# Patient Record
Sex: MALE | Race: WHITE | NOT HISPANIC OR LATINO | Employment: OTHER | URBAN - METROPOLITAN AREA
[De-identification: names, ages, dates, MRNs, and addresses within clinical notes are randomized per-mention and may not be internally consistent; named-entity substitution may affect disease eponyms.]

---

## 2017-09-04 ENCOUNTER — APPOINTMENT (OUTPATIENT)
Dept: GENERAL RADIOLOGY | Facility: HOSPITAL | Age: 47
End: 2017-09-04

## 2017-09-04 ENCOUNTER — HOSPITAL ENCOUNTER (INPATIENT)
Facility: HOSPITAL | Age: 47
LOS: 2 days | Discharge: HOME | End: 2017-09-07
Attending: EMERGENCY MEDICINE | Admitting: INTERNAL MEDICINE

## 2017-09-04 DIAGNOSIS — N30.01 ACUTE CYSTITIS WITH HEMATURIA: Primary | ICD-10-CM

## 2017-09-04 DIAGNOSIS — A41.9 SEPSIS, DUE TO UNSPECIFIED ORGANISM: ICD-10-CM

## 2017-09-04 LAB
ALBUMIN SERPL-MCNC: 4.3 G/DL (ref 3.2–4.8)
ALBUMIN/GLOB SERPL: 1.4 G/DL (ref 1.5–2.5)
ALP SERPL-CCNC: 63 U/L (ref 25–100)
ALT SERPL W P-5'-P-CCNC: 62 U/L (ref 7–40)
ANION GAP SERPL CALCULATED.3IONS-SCNC: 11 MMOL/L (ref 3–11)
APTT PPP: 29.1 SECONDS (ref 24–31)
ARTERIAL PATENCY WRIST A: ABNORMAL
AST SERPL-CCNC: 32 U/L (ref 0–33)
ATMOSPHERIC PRESS: ABNORMAL MMHG
BACTERIA UR QL AUTO: ABNORMAL /HPF
BASE EXCESS BLDA CALC-SCNC: 1.7 MMOL/L (ref 0–2)
BASOPHILS # BLD AUTO: 0.03 10*3/MM3 (ref 0–0.2)
BASOPHILS NFR BLD AUTO: 0.1 % (ref 0–1)
BDY SITE: ABNORMAL
BILIRUB SERPL-MCNC: 0.8 MG/DL (ref 0.3–1.2)
BILIRUB UR QL STRIP: NEGATIVE
BUN BLD-MCNC: 14 MG/DL (ref 9–23)
BUN/CREAT SERPL: 11.7 (ref 7–25)
CA-I SERPL ISE-MCNC: 1.14 MMOL/L (ref 1.12–1.32)
CALCIUM SPEC-SCNC: 8.9 MG/DL (ref 8.7–10.4)
CHLORIDE SERPL-SCNC: 103 MMOL/L (ref 99–109)
CLARITY UR: ABNORMAL
CO2 BLDA-SCNC: 25.9 MMOL/L (ref 22–33)
CO2 SERPL-SCNC: 25 MMOL/L (ref 20–31)
COHGB MFR BLD: 1.3 % (ref 0–2)
COLOR UR: YELLOW
CREAT BLD-MCNC: 1.2 MG/DL (ref 0.6–1.3)
CRP SERPL-MCNC: 5.16 MG/DL (ref 0–1)
D-LACTATE SERPL-SCNC: 1.9 MMOL/L (ref 0.5–2)
DEPRECATED RDW RBC AUTO: 41 FL (ref 37–54)
EOSINOPHIL # BLD AUTO: 0.02 10*3/MM3 (ref 0–0.3)
EOSINOPHIL NFR BLD AUTO: 0.1 % (ref 0–3)
ERYTHROCYTE [DISTWIDTH] IN BLOOD BY AUTOMATED COUNT: 12.8 % (ref 11.3–14.5)
GFR SERPL CREATININE-BSD FRML MDRD: 65 ML/MIN/1.73
GLOBULIN UR ELPH-MCNC: 3 GM/DL
GLUCOSE BLD-MCNC: 136 MG/DL (ref 70–100)
GLUCOSE UR STRIP-MCNC: NEGATIVE MG/DL
HCO3 BLDA-SCNC: 24.9 MMOL/L (ref 20–26)
HCT VFR BLD AUTO: 41.2 % (ref 38.9–50.9)
HCT VFR BLD CALC: 42.9 %
HGB BLD-MCNC: 13.8 G/DL (ref 13.1–17.5)
HGB BLDA-MCNC: 14 G/DL (ref 13.5–17.5)
HGB UR QL STRIP.AUTO: ABNORMAL
HOROWITZ INDEX BLD+IHG-RTO: 21 %
HYALINE CASTS UR QL AUTO: ABNORMAL /LPF
IMM GRANULOCYTES # BLD: 0.1 10*3/MM3 (ref 0–0.03)
IMM GRANULOCYTES NFR BLD: 0.5 % (ref 0–0.6)
INR PPP: 1.04
KETONES UR QL STRIP: NEGATIVE
LEUKOCYTE ESTERASE UR QL STRIP.AUTO: ABNORMAL
LYMPHOCYTES # BLD AUTO: 1.28 10*3/MM3 (ref 0.6–4.8)
LYMPHOCYTES NFR BLD AUTO: 6.3 % (ref 24–44)
MAGNESIUM SERPL-MCNC: 1.7 MG/DL (ref 1.3–2.7)
MCH RBC QN AUTO: 29.1 PG (ref 27–31)
MCHC RBC AUTO-ENTMCNC: 33.5 G/DL (ref 32–36)
MCV RBC AUTO: 86.9 FL (ref 80–99)
METHGB BLD QL: 0.8 % (ref 0–1.5)
MODALITY: ABNORMAL
MONOCYTES # BLD AUTO: 1.92 10*3/MM3 (ref 0–1)
MONOCYTES NFR BLD AUTO: 9.4 % (ref 0–12)
NEUTROPHILS # BLD AUTO: 17.06 10*3/MM3 (ref 1.5–8.3)
NEUTROPHILS NFR BLD AUTO: 83.6 % (ref 41–71)
NITRITE UR QL STRIP: NEGATIVE
OXYHGB MFR BLDV: 94.5 % (ref 94–99)
PCO2 BLDA: 33.9 MM HG (ref 35–48)
PH BLDA: 7.47 PH UNITS (ref 7.35–7.45)
PH UR STRIP.AUTO: 8.5 [PH] (ref 5–8)
PHOSPHATE SERPL-MCNC: 2.4 MG/DL (ref 2.4–5.1)
PLATELET # BLD AUTO: 229 10*3/MM3 (ref 150–450)
PMV BLD AUTO: 10 FL (ref 6–12)
PO2 BLDA: 71.9 MM HG (ref 83–108)
POTASSIUM BLD-SCNC: 3.9 MMOL/L (ref 3.5–5.5)
PROT SERPL-MCNC: 7.3 G/DL (ref 5.7–8.2)
PROT UR QL STRIP: ABNORMAL
PROTHROMBIN TIME: 11.3 SECONDS (ref 9.6–11.5)
RBC # BLD AUTO: 4.74 10*6/MM3 (ref 4.2–5.76)
RBC # UR: ABNORMAL /HPF
REF LAB TEST METHOD: ABNORMAL
SAO2 % BLDCOA: 94.5 %
SAO2 % BLDCOA: 94.5 %
SODIUM BLD-SCNC: 139 MMOL/L (ref 132–146)
SP GR UR STRIP: 1.02 (ref 1–1.03)
SQUAMOUS #/AREA URNS HPF: ABNORMAL /HPF
UROBILINOGEN UR QL STRIP: ABNORMAL
WBC NRBC COR # BLD: 20.41 10*3/MM3 (ref 3.5–10.8)
WBC UR QL AUTO: ABNORMAL /HPF

## 2017-09-04 PROCEDURE — 82805 BLOOD GASES W/O2 SATURATION: CPT | Performed by: EMERGENCY MEDICINE

## 2017-09-04 PROCEDURE — 93005 ELECTROCARDIOGRAM TRACING: CPT | Performed by: EMERGENCY MEDICINE

## 2017-09-04 PROCEDURE — 85025 COMPLETE CBC W/AUTO DIFF WBC: CPT | Performed by: EMERGENCY MEDICINE

## 2017-09-04 PROCEDURE — 87077 CULTURE AEROBIC IDENTIFY: CPT | Performed by: EMERGENCY MEDICINE

## 2017-09-04 PROCEDURE — 80053 COMPREHEN METABOLIC PANEL: CPT | Performed by: EMERGENCY MEDICINE

## 2017-09-04 PROCEDURE — 83735 ASSAY OF MAGNESIUM: CPT | Performed by: EMERGENCY MEDICINE

## 2017-09-04 PROCEDURE — 86140 C-REACTIVE PROTEIN: CPT | Performed by: EMERGENCY MEDICINE

## 2017-09-04 PROCEDURE — 36600 WITHDRAWAL OF ARTERIAL BLOOD: CPT | Performed by: EMERGENCY MEDICINE

## 2017-09-04 PROCEDURE — 87186 SC STD MICRODIL/AGAR DIL: CPT | Performed by: EMERGENCY MEDICINE

## 2017-09-04 PROCEDURE — 71010 HC CHEST PA OR AP: CPT

## 2017-09-04 PROCEDURE — 85610 PROTHROMBIN TIME: CPT | Performed by: EMERGENCY MEDICINE

## 2017-09-04 PROCEDURE — 99285 EMERGENCY DEPT VISIT HI MDM: CPT

## 2017-09-04 PROCEDURE — 87040 BLOOD CULTURE FOR BACTERIA: CPT | Performed by: EMERGENCY MEDICINE

## 2017-09-04 PROCEDURE — 81001 URINALYSIS AUTO W/SCOPE: CPT | Performed by: EMERGENCY MEDICINE

## 2017-09-04 PROCEDURE — 83605 ASSAY OF LACTIC ACID: CPT | Performed by: EMERGENCY MEDICINE

## 2017-09-04 PROCEDURE — 82330 ASSAY OF CALCIUM: CPT | Performed by: EMERGENCY MEDICINE

## 2017-09-04 PROCEDURE — 25010000002 MORPHINE PER 10 MG: Performed by: EMERGENCY MEDICINE

## 2017-09-04 PROCEDURE — 87086 URINE CULTURE/COLONY COUNT: CPT | Performed by: EMERGENCY MEDICINE

## 2017-09-04 PROCEDURE — 85730 THROMBOPLASTIN TIME PARTIAL: CPT | Performed by: EMERGENCY MEDICINE

## 2017-09-04 PROCEDURE — 84100 ASSAY OF PHOSPHORUS: CPT | Performed by: EMERGENCY MEDICINE

## 2017-09-04 PROCEDURE — 25010000002 PIPERACILLIN-TAZOBACTAM: Performed by: EMERGENCY MEDICINE

## 2017-09-04 RX ORDER — DICLOFENAC SODIUM 75 MG/1
75 TABLET, DELAYED RELEASE ORAL 2 TIMES DAILY
COMMUNITY

## 2017-09-04 RX ORDER — SODIUM CHLORIDE 0.9 % (FLUSH) 0.9 %
10 SYRINGE (ML) INJECTION AS NEEDED
Status: DISCONTINUED | OUTPATIENT
Start: 2017-09-04 | End: 2017-09-07 | Stop reason: HOSPADM

## 2017-09-04 RX ORDER — LISINOPRIL 20 MG/1
20 TABLET ORAL DAILY
COMMUNITY

## 2017-09-04 RX ORDER — SODIUM CHLORIDE 0.9 % (FLUSH) 0.9 %
10 SYRINGE (ML) INJECTION AS NEEDED
Status: CANCELLED | OUTPATIENT
Start: 2017-09-04

## 2017-09-04 RX ORDER — MORPHINE SULFATE 4 MG/ML
4 INJECTION, SOLUTION INTRAMUSCULAR; INTRAVENOUS ONCE
Status: COMPLETED | OUTPATIENT
Start: 2017-09-04 | End: 2017-09-04

## 2017-09-04 RX ORDER — OMEPRAZOLE 40 MG/1
40 CAPSULE, DELAYED RELEASE ORAL DAILY
COMMUNITY

## 2017-09-04 RX ADMIN — SODIUM CHLORIDE 2000 ML: 9 INJECTION, SOLUTION INTRAVENOUS at 23:48

## 2017-09-04 RX ADMIN — TAZOBACTAM SODIUM AND PIPERACILLIN SODIUM 4.5 G: .5; 4 INJECTION, POWDER, LYOPHILIZED, FOR SOLUTION INTRAVENOUS at 23:59

## 2017-09-04 RX ADMIN — MORPHINE SULFATE 4 MG: 4 INJECTION, SOLUTION INTRAMUSCULAR; INTRAVENOUS at 23:54

## 2017-09-05 ENCOUNTER — APPOINTMENT (OUTPATIENT)
Dept: CT IMAGING | Facility: HOSPITAL | Age: 47
End: 2017-09-05

## 2017-09-05 PROBLEM — A41.9 SEPSIS (HCC): Status: ACTIVE | Noted: 2017-09-05

## 2017-09-05 PROBLEM — I10 HYPERTENSION: Status: ACTIVE | Noted: 2017-09-05

## 2017-09-05 PROBLEM — K21.9 GERD (GASTROESOPHAGEAL REFLUX DISEASE): Status: ACTIVE | Noted: 2017-09-05

## 2017-09-05 PROBLEM — N39.0 COMPLICATED UTI (URINARY TRACT INFECTION): Status: ACTIVE | Noted: 2017-09-05

## 2017-09-05 PROBLEM — R73.9 HYPERGLYCEMIA: Status: ACTIVE | Noted: 2017-09-05

## 2017-09-05 PROBLEM — G93.41 METABOLIC ENCEPHALOPATHY: Status: ACTIVE | Noted: 2017-09-05

## 2017-09-05 LAB
ANION GAP SERPL CALCULATED.3IONS-SCNC: 10 MMOL/L (ref 3–11)
BASOPHILS # BLD AUTO: 0.03 10*3/MM3 (ref 0–0.2)
BASOPHILS NFR BLD AUTO: 0.1 % (ref 0–1)
BUN BLD-MCNC: 13 MG/DL (ref 9–23)
BUN/CREAT SERPL: 10 (ref 7–25)
C DIFF TOX GENS STL QL NAA+PROBE: NOT DETECTED
CALCIUM SPEC-SCNC: 8.5 MG/DL (ref 8.7–10.4)
CHLORIDE SERPL-SCNC: 104 MMOL/L (ref 99–109)
CO2 SERPL-SCNC: 25 MMOL/L (ref 20–31)
CREAT BLD-MCNC: 1.3 MG/DL (ref 0.6–1.3)
DEPRECATED RDW RBC AUTO: 42.9 FL (ref 37–54)
EOSINOPHIL # BLD AUTO: 0.01 10*3/MM3 (ref 0–0.3)
EOSINOPHIL NFR BLD AUTO: 0 % (ref 0–3)
ERYTHROCYTE [DISTWIDTH] IN BLOOD BY AUTOMATED COUNT: 13.2 % (ref 11.3–14.5)
GFR SERPL CREATININE-BSD FRML MDRD: 60 ML/MIN/1.73
GLUCOSE BLD-MCNC: 140 MG/DL (ref 70–100)
HBA1C MFR BLD: 5.4 % (ref 4.8–5.6)
HCT VFR BLD AUTO: 40.9 % (ref 38.9–50.9)
HGB BLD-MCNC: 13.2 G/DL (ref 13.1–17.5)
HOLD SPECIMEN: NORMAL
IMM GRANULOCYTES # BLD: 0.09 10*3/MM3 (ref 0–0.03)
IMM GRANULOCYTES NFR BLD: 0.4 % (ref 0–0.6)
LYMPHOCYTES # BLD AUTO: 1.45 10*3/MM3 (ref 0.6–4.8)
LYMPHOCYTES NFR BLD AUTO: 6.9 % (ref 24–44)
MCH RBC QN AUTO: 28.8 PG (ref 27–31)
MCHC RBC AUTO-ENTMCNC: 32.3 G/DL (ref 32–36)
MCV RBC AUTO: 89.3 FL (ref 80–99)
MONOCYTES # BLD AUTO: 1.68 10*3/MM3 (ref 0–1)
MONOCYTES NFR BLD AUTO: 8 % (ref 0–12)
NEUTROPHILS # BLD AUTO: 17.67 10*3/MM3 (ref 1.5–8.3)
NEUTROPHILS NFR BLD AUTO: 84.6 % (ref 41–71)
PLATELET # BLD AUTO: 212 10*3/MM3 (ref 150–450)
PMV BLD AUTO: 10.5 FL (ref 6–12)
POTASSIUM BLD-SCNC: 4.3 MMOL/L (ref 3.5–5.5)
RBC # BLD AUTO: 4.58 10*6/MM3 (ref 4.2–5.76)
SODIUM BLD-SCNC: 139 MMOL/L (ref 132–146)
WBC NRBC COR # BLD: 20.93 10*3/MM3 (ref 3.5–10.8)
WHOLE BLOOD HOLD SPECIMEN: NORMAL
WHOLE BLOOD HOLD SPECIMEN: NORMAL

## 2017-09-05 PROCEDURE — 25010000002 PIPERACILLIN-TAZOBACTAM: Performed by: PHYSICIAN ASSISTANT

## 2017-09-05 PROCEDURE — 25010000002 VANCOMYCIN: Performed by: EMERGENCY MEDICINE

## 2017-09-05 PROCEDURE — 87493 C DIFF AMPLIFIED PROBE: CPT | Performed by: PHYSICIAN ASSISTANT

## 2017-09-05 PROCEDURE — 85025 COMPLETE CBC W/AUTO DIFF WBC: CPT | Performed by: PHYSICIAN ASSISTANT

## 2017-09-05 PROCEDURE — 80048 BASIC METABOLIC PNL TOTAL CA: CPT | Performed by: PHYSICIAN ASSISTANT

## 2017-09-05 PROCEDURE — 25010000002 HEPARIN (PORCINE) PER 1000 UNITS: Performed by: PHYSICIAN ASSISTANT

## 2017-09-05 PROCEDURE — 0 IOPAMIDOL 61 % SOLUTION: Performed by: INTERNAL MEDICINE

## 2017-09-05 PROCEDURE — 83036 HEMOGLOBIN GLYCOSYLATED A1C: CPT | Performed by: PHYSICIAN ASSISTANT

## 2017-09-05 PROCEDURE — 99223 1ST HOSP IP/OBS HIGH 75: CPT | Performed by: INTERNAL MEDICINE

## 2017-09-05 PROCEDURE — 74177 CT ABD & PELVIS W/CONTRAST: CPT

## 2017-09-05 RX ORDER — ONDANSETRON 4 MG/1
4 TABLET, FILM COATED ORAL EVERY 6 HOURS PRN
Status: DISCONTINUED | OUTPATIENT
Start: 2017-09-05 | End: 2017-09-07 | Stop reason: HOSPADM

## 2017-09-05 RX ORDER — SODIUM CHLORIDE 0.9 % (FLUSH) 0.9 %
1-10 SYRINGE (ML) INJECTION AS NEEDED
Status: DISCONTINUED | OUTPATIENT
Start: 2017-09-05 | End: 2017-09-07 | Stop reason: HOSPADM

## 2017-09-05 RX ORDER — ONDANSETRON 2 MG/ML
4 INJECTION INTRAMUSCULAR; INTRAVENOUS EVERY 6 HOURS PRN
Status: DISCONTINUED | OUTPATIENT
Start: 2017-09-05 | End: 2017-09-07 | Stop reason: HOSPADM

## 2017-09-05 RX ORDER — PANTOPRAZOLE SODIUM 40 MG/1
40 TABLET, DELAYED RELEASE ORAL EVERY MORNING
Status: DISCONTINUED | OUTPATIENT
Start: 2017-09-05 | End: 2017-09-07 | Stop reason: HOSPADM

## 2017-09-05 RX ORDER — ACETAMINOPHEN 325 MG/1
650 TABLET ORAL EVERY 4 HOURS PRN
Status: DISCONTINUED | OUTPATIENT
Start: 2017-09-05 | End: 2017-09-07 | Stop reason: HOSPADM

## 2017-09-05 RX ORDER — SODIUM CHLORIDE 9 MG/ML
125 INJECTION, SOLUTION INTRAVENOUS CONTINUOUS
Status: DISCONTINUED | OUTPATIENT
Start: 2017-09-05 | End: 2017-09-07 | Stop reason: HOSPADM

## 2017-09-05 RX ORDER — DICLOFENAC SODIUM AND MISOPROSTOL 75; 200 MG/1; UG/1
1 TABLET, DELAYED RELEASE ORAL EVERY 12 HOURS PRN
Status: DISCONTINUED | OUTPATIENT
Start: 2017-09-05 | End: 2017-09-07 | Stop reason: HOSPADM

## 2017-09-05 RX ORDER — HEPARIN SODIUM 5000 [USP'U]/ML
5000 INJECTION, SOLUTION INTRAVENOUS; SUBCUTANEOUS EVERY 12 HOURS SCHEDULED
Status: DISCONTINUED | OUTPATIENT
Start: 2017-09-05 | End: 2017-09-07 | Stop reason: HOSPADM

## 2017-09-05 RX ADMIN — PANTOPRAZOLE SODIUM 40 MG: 40 TABLET, DELAYED RELEASE ORAL at 05:52

## 2017-09-05 RX ADMIN — SODIUM CHLORIDE 125 ML/HR: 9 INJECTION, SOLUTION INTRAVENOUS at 02:58

## 2017-09-05 RX ADMIN — VANCOMYCIN HYDROCHLORIDE 2000 MG: 1 INJECTION, POWDER, LYOPHILIZED, FOR SOLUTION INTRAVENOUS at 00:37

## 2017-09-05 RX ADMIN — PIPERACILLIN SODIUM,TAZOBACTAM SODIUM 3.38 G: 3; .375 INJECTION, POWDER, FOR SOLUTION INTRAVENOUS at 13:20

## 2017-09-05 RX ADMIN — ACETAMINOPHEN 650 MG: 325 TABLET, FILM COATED ORAL at 07:19

## 2017-09-05 RX ADMIN — HEPARIN SODIUM 5000 UNITS: 5000 INJECTION, SOLUTION INTRAVENOUS; SUBCUTANEOUS at 08:47

## 2017-09-05 RX ADMIN — PIPERACILLIN SODIUM,TAZOBACTAM SODIUM 3.38 G: 3; .375 INJECTION, POWDER, FOR SOLUTION INTRAVENOUS at 23:39

## 2017-09-05 RX ADMIN — PIPERACILLIN SODIUM,TAZOBACTAM SODIUM 3.38 G: 3; .375 INJECTION, POWDER, FOR SOLUTION INTRAVENOUS at 17:30

## 2017-09-05 RX ADMIN — PIPERACILLIN SODIUM,TAZOBACTAM SODIUM 3.38 G: 3; .375 INJECTION, POWDER, FOR SOLUTION INTRAVENOUS at 05:52

## 2017-09-05 RX ADMIN — SODIUM CHLORIDE 125 ML/HR: 9 INJECTION, SOLUTION INTRAVENOUS at 23:38

## 2017-09-05 RX ADMIN — IOPAMIDOL 100 ML: 612 INJECTION, SOLUTION INTRAVENOUS at 01:10

## 2017-09-05 RX ADMIN — ACETAMINOPHEN 650 MG: 325 TABLET, FILM COATED ORAL at 20:38

## 2017-09-05 RX ADMIN — HEPARIN SODIUM 5000 UNITS: 5000 INJECTION, SOLUTION INTRAVENOUS; SUBCUTANEOUS at 01:53

## 2017-09-05 RX ADMIN — HEPARIN SODIUM 5000 UNITS: 5000 INJECTION, SOLUTION INTRAVENOUS; SUBCUTANEOUS at 20:33

## 2017-09-05 RX ADMIN — DICLOFENAC SODIUM AND MISOPROSTOL 1 TABLET: 75; .2 TABLET, DELAYED RELEASE ORAL at 16:49

## 2017-09-05 NOTE — PAYOR COMM NOTE
"#[id number]        Joey García (46 y.o. Male)     Date of Birth Social Security Number Address Home Phone MRN    1970  [address] [facility] [phone] [id number]    Restoration Marital Status          None        Admission Date Admission Type Admitting Provider Attending Provider Department, Room/Bed    9/4/17 Emergency Natalia Spivey DO Barbato, Hayley R, DO [facility] 5G, S559/1    Discharge Date Discharge Disposition Discharge Destination                      Attending Provider: Natalia Spivey DO     Allergies:  Verapamil    Isolation:  None   Infection:  None   Code Status:  FULL    Ht:  72\" (182.9 cm)   Wt:  289 lb (131 kg)    Admission Cmt:  None   Principal Problem:  Sepsis [A41.9]                 Active Insurance as of 9/4/2017     Primary Coverage     Payor Plan Insurance Group Employer/Plan Group    [employer]ET[employer] COMMERCIAL AETNA [id number]     Payor Plan Address Payor Plan Phone Number Effective From Effective To    [address] [phone] 1/1/2016     [place] [id number]       Subscriber Name Subscriber Birth Date Member ID       CLARISA GARCÍA D 7/4/1975 [id number]                 Emergency Contacts      (Rel.) Home Phone Work Phone Mobile Phone    Clarisa García (Spouse) [phone] -- --               History & Physical      Isabel Butterfield MD at 9/5/2017 12:08 AM              [facility]  HISTORY AND PHYSICAL    Primary Care Physician: Tod Medellin MD    Subjective     Chief Complaint:  Dysuria    History of Present Illness:   This is a 46 year old male with a past medical history significant for WPW, hypertension, and GERD who presents with nearly 24 hours of dysuria. States he awoke at 2:00 am Monday morning to void when his stream \"suddenly stopped and he couldn't get the rest out\". He tried to returning to bed only to repeatedly get up and find that \"nothing was coming out\". There is " associated suprapubic pressure which is relieved with attempts to void. Also noting subjective fever with chills, diaphoresis, nausea without vomiting and non bloody diarrhea. Last bowel movement prior to arrival around 4 hours ago. States he has been on two rounds of Amoxicillin for a tooth abscess within the past month. Denies cough, congestion, SOB, chest pain, penile/testicular pain or discharge. No hematuria. No genital rash or history of prostate issues. No flank pain. Wife at bedside reporting that when she returned home from work this evening, patient appeared to be in acute distress. She was concerned with uncharacteristic pallor and shallow breathing. Patient now presenting to [facility] for further evaluation and treatment.     Emergency Department Evaluation: Septic. Febrile, T-max 100.5. Tachycardic to 127 with stable /70. Blood glucose elevated to 136.0. WBC increased to 20.4. Hematology and chemistry otherwise favorable. Urinalysis positive for acute infection. Negative nitrites. CXR negative for acute process.      Review of Systems   Constitutional: Positive for chills, diaphoresis, fatigue and fever.   HENT: Negative for congestion and trouble swallowing.    Eyes: Negative for photophobia and visual disturbance.   Respiratory: Negative for cough and shortness of breath.    Cardiovascular: Negative for chest pain and leg swelling.   Gastrointestinal: Positive for diarrhea and nausea. Negative for abdominal pain, constipation and vomiting.   Endocrine: Negative for cold intolerance and heat intolerance.   Genitourinary: Positive for difficulty urinating, dysuria and frequency. Negative for decreased urine volume, hematuria, penile pain and scrotal swelling.   Musculoskeletal: Negative for back pain and joint swelling.   Skin: Positive for pallor. Negative for rash.   Allergic/Immunologic: Negative for immunocompromised state.   Neurological: Negative for dizziness, syncope and weakness.  "  Hematological: Negative for adenopathy.   Psychiatric/Behavioral: Negative for agitation and confusion.      Otherwise complete ROS performed and negative except as mentioned in the HPI.    Past Medical History:   Diagnosis Date   • GERD (gastroesophageal reflux disease)    • Hypertension        Past Surgical History:   Procedure Laterality Date   • SHOULDER SURGERY         Fam hx: no known history of prostrate issues    Social History     Social History   • Marital status:      Spouse name: N/A   • Number of children: N/A   • Years of education: N/A     Occupational History   • Not on file.     Social History Main Topics   • Smoking status: Never Smoker   • Smokeless tobacco: Not on file   • Alcohol use Not on file   • Drug use: Not on file   • Sexual activity: Not on file     Other Topics Concern   • Not on file     Social History Narrative       Medications:    (Not in a hospital admission)    Allergies:  Allergies   Allergen Reactions   • Verapamil          Objective     Physical Exam:  Vital Signs: /73  Pulse 110  Temp 100.5 °F (38.1 °C) (Oral)   Resp 26  Ht 72\" (182.9 cm)  Wt 287 lb (130 kg)  SpO2 95%  BMI 38.92 kg/m2  Physical Exam  Constitutional: in no acute distress but toxic appearing  Eyes: PERRLA, sclerae anicteric, no conjunctival injection  HENT: NCAT, mucous membranes moist  Neck: supple, no thyromegaly, no lymphadenopathy, trachea midline  Respiratory: Clear to auscultation bilaterally, nonlabored respirations, diminished breath sounds  Cardiovascular: RRR, no murmurs, rubs, or gallops, palpable pedal pulses bilaterally  Gastrointestinal: Positive bowel sounds, soft, nontender, nondistended, morbidly obese  Musculoskeletal: No bilateral ankle edema, no clubbing or cyanosis to bilateral lower extremities  Psychiatric: oriented x 3, appropriate affect, cooperative  Neurologic: Strength symmetric in all extremities, Cranial Nerves grossly intact to confrontation, speech " clear  Derm: no rashes    Results Reviewed:    Results from last 7 days  Lab Units 09/04/17  2250   WBC 10*3/mm3 20.41*   HEMOGLOBIN g/dL 13.8   PLATELETS 10*3/mm3 229       Results from last 7 days  Lab Units 09/04/17  2250   SODIUM mmol/L 139   POTASSIUM mmol/L 3.9   CO2 mmol/L 25.0   CREATININE mg/dL 1.20   GLUCOSE mg/dL 136*   CALCIUM mg/dL 8.9       I have personally reviewed and interpreted available lab data, radiology studies and ECG obtained at time of admission.     Assessment / Plan     Problem List:   Hospital Problem List     * (Principal)Sepsis    Complicated UTI (urinary tract infection)    Hyperglycemia    Hypertension    GERD (gastroesophageal reflux disease)          Plan:  1. Sepsis secondary to acute, complicated UTI:  - Vitals improved and stable after 2L bolus in ED. Monitor. Lactic acid 1.9  - complicated secondary to urine retention. Consider urology consult in am  - administered Vanc and Zosyn in ED. Will continue and await blood and urine cultuers  - no history of resistant angie  - saline 125 cc/hr  - considering CT abdomen  - am labs    2. Hyperglycemia:  - no history of DM, possible reactive to acute inflammatory process  - in the setting of morbid obesity will check A1c    3. Hypertension:  - BP labile. Hold ACE-I for now    Patient stable for admission to TELEMETRY. Labs and vitals routine per nursing.      DVT prophylaxis: subcutaneous heparin  Code Status: full  Admission Status: Patient will be admitted to (LEXOBS or LEXINPT)     Vick Martinez PA-C 09/05/17 12:40 AM      Brief Attending Admission Note     Mr. Quintero is a 46 yoM with hx of HTN, WPW, GERD, [facility] wife with one day of  concerns of increased urinary hesitancy, trouble voiding that started last night at 2am, patient however went to work today continued to have the same issues, but on getting back home he looked sick and was incoherent per his wife hence his presentation to the ED.This  has been associated with nausea with no vomiting, chills no fever, he denies having any prostrate issues      Attending Physical Exam:  Constitutional: no acute distress, awake, alert  Eyes: PERRLA, sclerae anicteric, no conjunctival injection  HENT: NCAT, mucous membranes moist  Neck: supple, no thyromegaly, no lymphadenopathy, trachea midline  Respiratory: Clear to auscultation bilaterally, nonlabored respirations   Cardiovascular: RRR, no murmurs, rubs, or gallops, palpable pedal pulses bilaterally  Gastrointestinal: Obese, positive bowel sounds, soft, nontender, nondistended  Musculoskeletal: No bilateral ankle edema, no clubbing or cyanosis to bilateral lower extremities  Psychiatric: oriented x 3, appropriate affect, cooperative  Neurologic: Strength symmetric in all extremities, Cranial Nerves grossly intact to confrontation, speech clear  Derm: no rashes     Brief Assessment/Plan :  46 yoM with hx of HTN p/w mild confusion, urinary urgency, admitted with metabolic encephalopathy that has since resolved, suspect infectious from UTI; sepsis per criteria with leukocytosis, tachycardia and fever, source likely UTI thus complicated. Getting CT abd/pelvis to r/o any obstructive etiology considering patient reports trouble voiding. He was started on abx, will continue for now, vanc and zosyn, de-escalate as cultures dictate, continue IVF, repeat labs in AM.     See above for further detailed assessment and plan developed with APC which I have reviewed and/or edited.     I believe this patient meets INPATIENT status due to the need for care which can only be reasonably provided in an hospital setting such as aggressive/expedited ancillary services and/or consultation services, the necessity for IV medications, close physician monitoring and/or the possible need for procedures.  In such, I feel patient’s risk for adverse outcomes and need for care warrant INPATIENT evaluation and predict the patient’s care encounter  to likely last beyond 2 midnights.    Isabel Butterfield MD  09/05/17  1:09 AM          Electronically signed by Isabel Butterfield MD at 9/5/2017  1:12 AM           Emergency Department Notes      Parish Combs DO at 9/4/2017 10:14 PM          Subjective   HPI Comments: Mr. Joey Quintero is a 46 y.o. male who presents to the ED with c/o difficulty urinating. He notes at around 0300 yesterday morning he had trouble voiding and emptying his bladder. He does note of some urinary frequency but only is able to void less than 100 mL of urine at a time. He denies any pain associated with his difficulty urinating. He does note of some nausea, chills, loss of appetite, generalized weakness, and diarrhea at 1800 but denies any vomiting, fevers, or any other acute sx at this time. He has a hx of vocal cord dysfunction and WPW.    Wife notes that after he came home from work today, he looked very pale, was laboring to breathe, and just 'looked out of it.' They went to a [facility] and was told to come to the ED for evaluation.    Patient is a 46 y.o. male presenting with difficulty urinating.   History provided by:  Patient  Difficulty Urinating   Presenting symptoms: no dysuria and no penile pain    Relieved by:  None tried  Worsened by:  Nothing  Ineffective treatments:  None tried  Associated symptoms: diarrhea, nausea and urinary frequency    Associated symptoms: no abdominal pain, no fever, no flank pain and no vomiting        Review of Systems   Constitutional: Positive for appetite change (Decreased), chills and fatigue. Negative for fever.   Respiratory: Positive for shortness of breath (Per wife).    Gastrointestinal: Positive for diarrhea and nausea. Negative for abdominal pain and vomiting.   Genitourinary: Positive for decreased urine volume, difficulty urinating and frequency. Negative for dysuria, flank pain and penile pain.   Skin: Positive for pallor (Per wife).   Neurological: Negative for light-headedness.   All other systems  reviewed and are negative.      Past Medical History:   Diagnosis Date   • GERD (gastroesophageal reflux disease)    • Hypertension        Allergies   Allergen Reactions   • Verapamil        Past Surgical History:   Procedure Laterality Date   • SHOULDER SURGERY         History reviewed. No pertinent family history.    Social History     Social History   • Marital status:      Spouse name: N/A   • Number of children: N/A   • Years of education: N/A     Social History Main Topics   • Smoking status: Never Smoker   • Smokeless tobacco: None   • Alcohol use None   • Drug use: None   • Sexual activity: Not Asked     Other Topics Concern   • None     Social History Narrative         Objective   Physical Exam   Constitutional: He is oriented to person, place, and time. He appears well-developed and well-nourished. No distress.   HENT:   Head: Normocephalic and atraumatic.   Nose: Nose normal.   Eyes: Conjunctivae are normal. No scleral icterus.   Neck: Normal range of motion. Neck supple.   Cardiovascular: Regular rhythm, normal heart sounds and intact distal pulses.  Tachycardia present.    No murmur heard.  Pulmonary/Chest: Effort normal and breath sounds normal. Tachypnea noted. No respiratory distress.   Abdominal: Soft. There is tenderness (Mild suprapubic discomfort to deep palpation).   Musculoskeletal: Normal range of motion.   Neurological: He is alert and oriented to person, place, and time.   No deficits   Skin: Skin is warm and dry. He is not diaphoretic.   Psychiatric: He has a normal mood and affect. His behavior is normal.   Nursing note and vitals reviewed.      Procedures        ED Course  ED Course   Comment By Time   Dr. Combs discussed case with Dr. Butterfield, hospitalist, who will admit the patient. Mauram Luisito 09/04 6997     Recent Results (from the past 24 hour(s))   Urinalysis With / Culture If Indicated    Collection Time: 09/04/17  9:33 PM   Result Value Ref Range    Color, UA Yellow Yellow,  Straw    Appearance, UA Cloudy (A) Clear    pH, UA 8.5 (H) 5.0 - 8.0    Specific Gravity, UA 1.021 1.001 - 1.030    Glucose, UA Negative Negative    Ketones, UA Negative Negative    Bilirubin, UA Negative Negative    Blood, UA Large (3+) (A) Negative    Protein, UA Trace (A) Negative    Leuk Esterase, UA Moderate (2+) (A) Negative    Nitrite, UA Negative Negative    Urobilinogen, UA 1.0 E.U./dL 0.2 - 1.0 E.U./dL   Urinalysis, Microscopic Only    Collection Time: 09/04/17  9:33 PM   Result Value Ref Range    RBC, UA Too Numerous to Count (A) None Seen, 0-2 /HPF    WBC, UA Too Numerous to Count (A) None Seen /HPF    Bacteria, UA Trace None Seen, Trace /HPF    Squamous Epithelial Cells, UA None Seen None Seen, 0-2 /HPF    Hyaline Casts, UA 0-6 0 - 6 /LPF    Methodology Automated Microscopy    Urine Culture    Collection Time: 09/04/17  9:33 PM   Result Value Ref Range    Urine Culture Culture in progress    Comprehensive Metabolic Panel    Collection Time: 09/04/17 10:50 PM   Result Value Ref Range    Glucose 136 (H) 70 - 100 mg/dL    BUN 14 9 - 23 mg/dL    Creatinine 1.20 0.60 - 1.30 mg/dL    Sodium 139 132 - 146 mmol/L    Potassium 3.9 3.5 - 5.5 mmol/L    Chloride 103 99 - 109 mmol/L    CO2 25.0 20.0 - 31.0 mmol/L    Calcium 8.9 8.7 - 10.4 mg/dL    Total Protein 7.3 5.7 - 8.2 g/dL    Albumin 4.30 3.20 - 4.80 g/dL    ALT (SGPT) 62 (H) 7 - 40 U/L    AST (SGOT) 32 0 - 33 U/L    Alkaline Phosphatase 63 25 - 100 U/L    Total Bilirubin 0.8 0.3 - 1.2 mg/dL    eGFR Non African Amer 65 >60 mL/min/1.73    Globulin 3.0 gm/dL    A/G Ratio 1.4 (L) 1.5 - 2.5 g/dL    BUN/Creatinine Ratio 11.7 7.0 - 25.0    Anion Gap 11.0 3.0 - 11.0 mmol/L   Protime-INR    Collection Time: 09/04/17 10:50 PM   Result Value Ref Range    Protime 11.3 9.6 - 11.5 Seconds    INR 1.04    aPTT    Collection Time: 09/04/17 10:50 PM   Result Value Ref Range    PTT 29.1 24.0 - 31.0 seconds   Magnesium    Collection Time: 09/04/17 10:50 PM   Result Value Ref  Range    Magnesium 1.7 1.3 - 2.7 mg/dL   Phosphorus    Collection Time: 09/04/17 10:50 PM   Result Value Ref Range    Phosphorus 2.4 2.4 - 5.1 mg/dL   Calcium, Ionized    Collection Time: 09/04/17 10:50 PM   Result Value Ref Range    Ionized Calcium 1.14 1.12 - 1.32 mmol/L   Lactic Acid, Plasma    Collection Time: 09/04/17 10:50 PM   Result Value Ref Range    Lactate 1.9 0.5 - 2.0 mmol/L   C-reactive Protein    Collection Time: 09/04/17 10:50 PM   Result Value Ref Range    C-Reactive Protein 5.16 (H) 0.00 - 1.00 mg/dL   Light Blue Top    Collection Time: 09/04/17 10:50 PM   Result Value Ref Range    Extra Tube hold for add-on    Green Top (Gel)    Collection Time: 09/04/17 10:50 PM   Result Value Ref Range    Extra Tube Hold for add-ons.    Lavender Top    Collection Time: 09/04/17 10:50 PM   Result Value Ref Range    Extra Tube hold for add-on    CBC Auto Differential    Collection Time: 09/04/17 10:50 PM   Result Value Ref Range    WBC 20.41 (H) 3.50 - 10.80 10*3/mm3    RBC 4.74 4.20 - 5.76 10*6/mm3    Hemoglobin 13.8 13.1 - 17.5 g/dL    Hematocrit 41.2 38.9 - 50.9 %    MCV 86.9 80.0 - 99.0 fL    MCH 29.1 27.0 - 31.0 pg    MCHC 33.5 32.0 - 36.0 g/dL    RDW 12.8 11.3 - 14.5 %    RDW-SD 41.0 37.0 - 54.0 fl    MPV 10.0 6.0 - 12.0 fL    Platelets 229 150 - 450 10*3/mm3    Neutrophil % 83.6 (H) 41.0 - 71.0 %    Lymphocyte % 6.3 (L) 24.0 - 44.0 %    Monocyte % 9.4 0.0 - 12.0 %    Eosinophil % 0.1 0.0 - 3.0 %    Basophil % 0.1 0.0 - 1.0 %    Immature Grans % 0.5 0.0 - 0.6 %    Neutrophils, Absolute 17.06 (H) 1.50 - 8.30 10*3/mm3    Lymphocytes, Absolute 1.28 0.60 - 4.80 10*3/mm3    Monocytes, Absolute 1.92 (H) 0.00 - 1.00 10*3/mm3    Eosinophils, Absolute 0.02 0.00 - 0.30 10*3/mm3    Basophils, Absolute 0.03 0.00 - 0.20 10*3/mm3    Immature Grans, Absolute 0.10 (H) 0.00 - 0.03 10*3/mm3   Blood Gas, Arterial    Collection Time: 09/04/17 11:31 PM   Result Value Ref Range    Site Arterial: left radial     Tee's Test N/A      pH, Arterial 7.474 (H) 7.350 - 7.450 pH units    pCO2, Arterial 33.9 (L) 35.0 - 48.0 mm Hg    pO2, Arterial 71.9 (L) 83.0 - 108.0 mm Hg    HCO3, Arterial 24.9 20.0 - 26.0 mmol/L    Base Excess, Arterial 1.7 0.0 - 2.0 mmol/L    O2 Saturation, Arterial 94.5 %    O2 Saturation Calculated 94.5 %    Hemoglobin, Blood Gas 14.0 13.5 - 17.5 g/dL    Hematocrit, Blood Gas 42.9 %    Oxyhemoglobin 94.5 94 - 99 %    Methemoglobin 0.80 0.00 - 1.50 %    Carboxyhemoglobin 1.3 0 - 2 %    CO2 Content 25.9 22 - 33    Barometric Pressure for Blood Gas  mmHg    Modality Room Air     FIO2 21 %   Basic Metabolic Panel    Collection Time: 09/05/17  3:19 AM   Result Value Ref Range    Glucose 140 (H) 70 - 100 mg/dL    BUN 13 9 - 23 mg/dL    Creatinine 1.30 0.60 - 1.30 mg/dL    Sodium 139 132 - 146 mmol/L    Potassium 4.3 3.5 - 5.5 mmol/L    Chloride 104 99 - 109 mmol/L    CO2 25.0 20.0 - 31.0 mmol/L    Calcium 8.5 (L) 8.7 - 10.4 mg/dL    eGFR Non African Amer 60 (L) >60 mL/min/1.73    BUN/Creatinine Ratio 10.0 7.0 - 25.0    Anion Gap 10.0 3.0 - 11.0 mmol/L   CBC Auto Differential    Collection Time: 09/05/17  3:19 AM   Result Value Ref Range    WBC 20.93 (H) 3.50 - 10.80 10*3/mm3    RBC 4.58 4.20 - 5.76 10*6/mm3    Hemoglobin 13.2 13.1 - 17.5 g/dL    Hematocrit 40.9 38.9 - 50.9 %    MCV 89.3 80.0 - 99.0 fL    MCH 28.8 27.0 - 31.0 pg    MCHC 32.3 32.0 - 36.0 g/dL    RDW 13.2 11.3 - 14.5 %    RDW-SD 42.9 37.0 - 54.0 fl    MPV 10.5 6.0 - 12.0 fL    Platelets 212 150 - 450 10*3/mm3    Neutrophil % 84.6 (H) 41.0 - 71.0 %    Lymphocyte % 6.9 (L) 24.0 - 44.0 %    Monocyte % 8.0 0.0 - 12.0 %    Eosinophil % 0.0 0.0 - 3.0 %    Basophil % 0.1 0.0 - 1.0 %    Immature Grans % 0.4 0.0 - 0.6 %    Neutrophils, Absolute 17.67 (H) 1.50 - 8.30 10*3/mm3    Lymphocytes, Absolute 1.45 0.60 - 4.80 10*3/mm3    Monocytes, Absolute 1.68 (H) 0.00 - 1.00 10*3/mm3    Eosinophils, Absolute 0.01 0.00 - 0.30 10*3/mm3    Basophils, Absolute 0.03 0.00 - 0.20 10*3/mm3     "Immature Grans, Absolute 0.09 (H) 0.00 - 0.03 10*3/mm3   Hemoglobin A1c    Collection Time: 09/05/17  3:19 AM   Result Value Ref Range    Hemoglobin A1C 5.40 4.80 - 5.60 %     Note: In addition to lab results from this visit, the labs listed above may include labs taken at another facility or during a different encounter within the last 24 hours. Please correlate lab times with ED admission and discharge times for further clarification of the services performed during this visit.    CT Abdomen Pelvis With Contrast   Final Result   Abnormal      1. No acute findings.      2. Non-acute findings are described above.      THIS DOCUMENT HAS BEEN ELECTRONICALLY SIGNED BY SHALINI MAC MD      XR Chest 1 View   Final Result   Abnormal      Normal chest radiograph.      THIS DOCUMENT HAS BEEN ELECTRONICALLY SIGNED BY SHALINI MAC MD        Vitals:    09/05/17 0030 09/05/17 0100 09/05/17 0559 09/05/17 0710   BP: 125/73 112/73 131/59 128/73   BP Location:  Right arm Right arm Right arm   Patient Position:  Lying Lying Lying   Pulse: 110 102  114   Resp:  22 20 20   Temp:  99.4 °F (37.4 °C) 99.1 °F (37.3 °C)    TempSrc:  Oral Oral    SpO2: 95%      Weight:  289 lb (131 kg)     Height:  72\" (182.9 cm)       Medications   sodium chloride 0.9 % flush 10 mL (not administered)   sodium chloride 0.9 % flush 1-10 mL (not administered)   heparin (porcine) 5000 UNIT/ML injection 5,000 Units (5,000 Units Subcutaneous Given 9/5/17 2326)   sodium chloride 0.9 % infusion (125 mL/hr Intravenous New Bag 9/5/17 0258)   acetaminophen (TYLENOL) tablet 650 mg (650 mg Oral Given 9/5/17 0719)   ondansetron (ZOFRAN) tablet 4 mg (not administered)     Or   ondansetron (ZOFRAN) injection 4 mg (not administered)   melatonin sublingual tablet 5 mg (not administered)   Pharmacy to dose vancomycin (not administered)   piperacillin-tazobactam (ZOSYN) 3.375 g/100 mL 0.9% NS IVPB (mbp) (3.375 g Intravenous New Bag 9/5/17 9812)   pantoprazole (PROTONIX) EC " tablet 40 mg (40 mg Oral Given 9/5/17 0552)   vancomycin 1500 mg/500 mL 0.9% NS IVPB ([facility]) (not administered)   ! Vancomycin trough 9/6 at 1130 (not administered)   sodium chloride 0.9 % bolus 2,000 mL (2,000 mL Intravenous New Bag 9/4/17 2348)   piperacillin-tazobactam (ZOSYN) 4.5 g/100 mL 0.9% NS IVPB (mbp) (0 g Intravenous Stopped 9/5/17 0032)   vancomycin 2000 mg/500 mL 0.9% NS IVPB ([facility]) (2,000 mg Intravenous New Bag 9/5/17 0037)   Morphine sulfate (PF) injection 4 mg (4 mg Intravenous Given 9/4/17 2354)   iopamidol (ISOVUE-300) 61 % injection 100 mL (100 mL Intravenous Given 9/5/17 0110)     ECG/EMG Results (last 24 hours)     ** No results found for the last 24 hours. **                        [facility]    Final diagnoses:   Acute cystitis with hematuria   Sepsis, due to unspecified organism       Documentation assistance provided by patsy KAUFMAN.  Information recorded by the scriblori was done at my direction and has been verified and validated by me.     Fernanda Kaufman  09/04/17 2229       Parish Combs DO  09/05/17 0933       Electronically signed by Parish Combs DO at 9/5/2017  9:33 AM        Vital Signs (last 24 hours)       09/04 0700  -  09/05 0659 09/05 0700  -  09/05 1457   Most Recent    Temp (°F) 99.1 -  100.5      98.5     98.5 (36.9)    Heart Rate 102 -  (!)127      114     114    Resp 20 -  26      20     20    /73 -  145/80      128/73     128/73    SpO2 (%) 95 -  98       95          Hospital Medications (all)       Dose Frequency Start End    acetaminophen (TYLENOL) tablet 650 mg 650 mg Every 4 Hours PRN 9/5/2017     Sig - Route: Take 2 tablets by mouth Every 4 (Four) Hours As Needed for Mild Pain . - Oral    diclofenac-misoprostol (ARTHROTEC 75) 75-0.2 MG EC tablet 1 tablet 1 tablet Every 12 Hours PRN 9/5/2017     Sig - Route: Take 1 tablet by mouth Every 12 (Twelve) Hours As Needed (joint pain). - Oral    heparin (porcine) 5000 UNIT/ML injection 5,000 Units 5,000 Units Every 12 Hours Scheduled  "9/5/2017     Sig - Route: Inject 1 mL under the skin Every 12 (Twelve) Hours. - Subcutaneous    iopamidol (ISOVUE-300) 61 % injection 100 mL 100 mL Once in Imaging 9/5/2017 9/5/2017    Sig - Route: Infuse 100 mL into a venous catheter Once. - Intravenous    melatonin sublingual tablet 5 mg 5 mg Nightly PRN 9/5/2017     Sig - Route: Place 1 tablet under the tongue At Night As Needed (sleep). - Sublingual    Morphine sulfate (PF) injection 4 mg 4 mg Once 9/4/2017 9/4/2017    Sig - Route: Infuse 1 mL into a venous catheter 1 (One) Time. - Intravenous    ondansetron (ZOFRAN) injection 4 mg 4 mg Every 6 Hours PRN 9/5/2017     Sig - Route: Infuse 2 mL into a venous catheter Every 6 (Six) Hours As Needed for Nausea or Vomiting. - Intravenous    Linked Group 1:  \"Or\" Linked Group Details        ondansetron (ZOFRAN) tablet 4 mg 4 mg Every 6 Hours PRN 9/5/2017     Sig - Route: Take 1 tablet by mouth Every 6 (Six) Hours As Needed for Nausea or Vomiting. - Oral    Linked Group 1:  \"Or\" Linked Group Details        pantoprazole (PROTONIX) EC tablet 40 mg 40 mg Every Morning 9/5/2017     Sig - Route: Take 1 tablet by mouth Every Morning. - Oral    piperacillin-tazobactam (ZOSYN) 3.375 g/100 mL 0.9% NS IVPB ([facility]) 3.375 g Every 6 Hours 9/5/2017     Sig - Route: Infuse 100 mL into a venous catheter Every 6 (Six) Hours. - Intravenous    piperacillin-tazobactam (ZOSYN) 4.5 g/100 mL 0.9% NS IVPB ([facility]p) 4.5 g Once 9/4/2017 9/5/2017    Sig - Route: Infuse 100 mL into a venous catheter 1 (One) Time. - Intravenous    sodium chloride 0.9 % bolus 2,000 mL 2,000 mL Once 9/4/2017 9/4/2017    Sig - Route: Infuse 2,000 mL into a venous catheter 1 (One) Time. - Intravenous    sodium chloride 0.9 % flush 1-10 mL 1-10 mL As Needed 9/5/2017     Sig - Route: Infuse 1-10 mL into a venous catheter As Needed for Line Care. - Intravenous    sodium chloride 0.9 % flush 10 mL 10 mL As Needed 9/4/2017     Sig - Route: Infuse 10 mL into a venous catheter As " Needed for Line Care. - Intravenous    sodium chloride 0.9 % infusion 125 mL/hr Continuous 9/5/2017     Sig - Route: Infuse 125 mL/hr into a venous catheter Continuous. - Intravenous    vancomycin 2000 mg/500 mL 0.9% NS IVPB ([facility]) 2,000 mg Once 9/4/2017 9/5/2017    Sig - Route: Infuse 500 mL into a venous catheter 1 (One) Time. - Intravenous    ! Vancomycin trough 9/6 at 1130 (Discontinued)  Once 9/6/2017 9/5/2017    Sig - Route: 1 (One) Time. - Does not apply    Pharmacy to dose vancomycin (Discontinued)  Continuous PRN 9/5/2017 9/5/2017    Sig - Route: Continuous As Needed for Consult. - Does not apply    vancomycin 1500 mg/500 mL 0.9% NS IVPB ([facility]) (Discontinued) 11 mg/kg × 130 kg Every 12 Hours 9/5/2017 9/5/2017    Sig - Route: Infuse 500 mL into a venous catheter Every 12 (Twelve) Hours. - Intravenous    vancomycin 2500 mg/500 mL 0.9% NS IVPB ([facility]) (Discontinued) 20 mg/kg × 130 kg Once 9/4/2017 9/4/2017    Sig - Route: Infuse 500 mL into a venous catheter 1 (One) Time. - Intravenous    Reason for Discontinue: Dose adjustment          Lab Results (last 72 hours)     Procedure Component Value Units Date/Time    Urinalysis With / Culture If Indicated [[id number]]  (Abnormal) Collected:  09/04/17 2133    Specimen:  Urine from Urine, Clean Catch Updated:  09/04/17 2154     Color, UA Yellow     Appearance, UA Cloudy (A)     pH, UA 8.5 (H)     Specific Gravity, UA 1.021     Glucose, UA Negative     Ketones, UA Negative     Bilirubin, UA Negative     Blood, UA Large (3+) (A)     Protein, UA Trace (A)     Leuk Esterase, UA Moderate (2+) (A)     Nitrite, UA Negative     Urobilinogen, UA 1.0 E.U./dL    Urinalysis, Microscopic Only [[id number]]  (Abnormal) Collected:  09/04/17 2133    Specimen:  Urine from Urine, Clean Catch Updated:  09/04/17 2154     RBC, UA Too Numerous to Count (A) /HPF      WBC, UA Too Numerous to Count (A) /HPF      Bacteria, UA Trace /HPF      Squamous Epithelial Cells, UA None Seen /HPF      Hyaline  Casts, UA 0-6 /LPF      Methodology Automated Microscopy    CBC & Differential [[id number]] Collected:  09/04/17 2250    Specimen:  Blood Updated:  09/04/17 2307    Narrative:       The following orders were created for panel order CBC & Differential.  Procedure                               Abnormality         Status                     ---------                               -----------         ------                     CBC Auto Differential[[id number]]        Abnormal            Final result                 Please view results for these tests on the individual orders.    CBC Auto Differential [[id number]]  (Abnormal) Collected:  09/04/17 2250    Specimen:  Blood Updated:  09/04/17 2307     WBC 20.41 (H) 10*3/mm3      RBC 4.74 10*6/mm3      Hemoglobin 13.8 g/dL      Hematocrit 41.2 %      MCV 86.9 fL      MCH 29.1 pg      MCHC 33.5 g/dL      RDW 12.8 %      RDW-SD 41.0 fl      MPV 10.0 fL      Platelets 229 10*3/mm3      Neutrophil % 83.6 (H) %      Lymphocyte % 6.3 (L) %      Monocyte % 9.4 %      Eosinophil % 0.1 %      Basophil % 0.1 %      Immature Grans % 0.5 %      Neutrophils, Absolute 17.06 (H) 10*3/mm3      Lymphocytes, Absolute 1.28 10*3/mm3      Monocytes, Absolute 1.92 (H) 10*3/mm3      Eosinophils, Absolute 0.02 10*3/mm3      Basophils, Absolute 0.03 10*3/mm3      Immature Grans, Absolute 0.10 (H) 10*3/mm3     Protime-INR [[id number]] Collected:  09/04/17 2250    Specimen:  Blood Updated:  09/04/17 2321     Protime 11.3 Seconds      INR 1.04    Narrative:       Therapeutic Ranges for INR: 2.0-3.0 (PT 20-30)                              2.5-3.5 (PT 25-34)    aPTT [[id number]]  (Normal) Collected:  09/04/17 2250    Specimen:  Blood Updated:  09/04/17 2321     PTT 29.1 seconds     Narrative:       PTT = The equivalent PTT values for the therapeutic range of heparin levels at 0.3 to 0.5 U/ml are 45 to 60 seconds.    Lactic Acid, Plasma [[id number]]  (Normal) Collected:  09/04/17 2250    Specimen:  Blood  Updated:  09/04/17 2329     Lactate 1.9 mmol/L       Falsely depressed results may occur on samples drawn from patients receiving N-Acetylcysteine (NAC) or Metamizole.       C-reactive Protein [[id number]]  (Abnormal) Collected:  09/04/17 2250    Specimen:  Blood Updated:  09/04/17 2335     C-Reactive Protein 5.16 (H) mg/dL     Comprehensive Metabolic Panel [[id number]]  (Abnormal) Collected:  09/04/17 2250    Specimen:  Blood Updated:  09/04/17 2338     Glucose 136 (H) mg/dL      BUN 14 mg/dL      Creatinine 1.20 mg/dL      Sodium 139 mmol/L      Potassium 3.9 mmol/L      Chloride 103 mmol/L      CO2 25.0 mmol/L      Calcium 8.9 mg/dL      Total Protein 7.3 g/dL      Albumin 4.30 g/dL      ALT (SGPT) 62 (H) U/L      AST (SGOT) 32 U/L      Alkaline Phosphatase 63 U/L      Total Bilirubin 0.8 mg/dL      eGFR Non African Amer 65 mL/min/1.73      Globulin 3.0 gm/dL      A/G Ratio 1.4 (L) g/dL      BUN/Creatinine Ratio 11.7     Anion Gap 11.0 mmol/L     Narrative:       National Kidney Foundation Guidelines    Stage     Description        GFR  1         Normal or High     90+  2         Mild decrease      60-89  3         Moderate decrease  30-59  4         Severe decrease    15-29  5         Kidney failure     <15    Magnesium [[id number]]  (Normal) Collected:  09/04/17 2250    Specimen:  Blood Updated:  09/04/17 2338     Magnesium 1.7 mg/dL     Phosphorus [[id number]]  (Normal) Collected:  09/04/17 2250    Specimen:  Blood Updated:  09/04/17 2338     Phosphorus 2.4 mg/dL     Blood Gas, Arterial [[id number]]  (Abnormal) Collected:  09/04/17 2331    Specimen:  Arterial Blood Updated:  09/04/17 2340     Site Arterial: left radial     Tee's Test N/A     pH, Arterial 7.474 (H) pH units      pCO2, Arterial 33.9 (L) mm Hg      pO2, Arterial 71.9 (L) mm Hg      HCO3, Arterial 24.9 mmol/L      Base Excess, Arterial 1.7 mmol/L      O2 Saturation, Arterial 94.5 %      O2 Saturation Calculated 94.5 %      Hemoglobin, Blood Gas 14.0  g/dL      Hematocrit, Blood Gas 42.9 %      Oxyhemoglobin 94.5 %      Methemoglobin 0.80 %      Carboxyhemoglobin 1.3 %      CO2 Content 25.9     Barometric Pressure for Blood Gas -- mmHg       N/A        Modality Room Air     FIO2 21 %     Calcium, Ionized [[id number]]  (Normal) Collected:  09/04/17 2250    Specimen:  Blood Updated:  09/04/17 2340     Ionized Calcium 1.14 mmol/L     Light Blue Top [[id number]] Collected:  09/04/17 2250    Specimen:  Blood Updated:  09/05/17 0002     Extra Tube hold for add-on      Auto resulted       Green Top (Gel) [[id number]] Collected:  09/04/17 2250    Specimen:  Blood Updated:  09/05/17 0002     Extra Tube Hold for add-ons.      Auto resulted.       Lavender Top [[id number]] Collected:  09/04/17 2250    Specimen:  Blood Updated:  09/05/17 0002     Extra Tube hold for add-on      Auto resulted       CBC Auto Differential [[id number]]  (Abnormal) Collected:  09/05/17 0319    Specimen:  Blood Updated:  09/05/17 0359     WBC 20.93 (H) 10*3/mm3      RBC 4.58 10*6/mm3      Hemoglobin 13.2 g/dL      Hematocrit 40.9 %      MCV 89.3 fL      MCH 28.8 pg      MCHC 32.3 g/dL      RDW 13.2 %      RDW-SD 42.9 fl      MPV 10.5 fL      Platelets 212 10*3/mm3      Neutrophil % 84.6 (H) %      Lymphocyte % 6.9 (L) %      Monocyte % 8.0 %      Eosinophil % 0.0 %      Basophil % 0.1 %      Immature Grans % 0.4 %      Neutrophils, Absolute 17.67 (H) 10*3/mm3      Lymphocytes, Absolute 1.45 10*3/mm3      Monocytes, Absolute 1.68 (H) 10*3/mm3      Eosinophils, Absolute 0.01 10*3/mm3      Basophils, Absolute 0.03 10*3/mm3      Immature Grans, Absolute 0.09 (H) 10*3/mm3     Basic Metabolic Panel [[id number]]  (Abnormal) Collected:  09/05/17 0319    Specimen:  Blood Updated:  09/05/17 0413     Glucose 140 (H) mg/dL      BUN 13 mg/dL      Creatinine 1.30 mg/dL      Sodium 139 mmol/L      Potassium 4.3 mmol/L      Chloride 104 mmol/L      CO2 25.0 mmol/L      Calcium 8.5 (L) mg/dL      eGFR Non African Amer  60 (L) mL/min/1.73      BUN/Creatinine Ratio 10.0     Anion Gap 10.0 mmol/L     Narrative:       National Kidney Foundation Guidelines    Stage     Description        GFR  1         Normal or High     90+  2         Mild decrease      60-89  3         Moderate decrease  30-59  4         Severe decrease    15-29  5         Kidney failure     <15    [facility] Draw [[id number]] Collected:  09/04/17 2250    Specimen:  Blood Updated:  09/05/17 0616    Narrative:       The following orders were created for panel order [facility] Draw.  Procedure                               Abnormality         Status                     ---------                               -----------         ------                     Light Blue Top[[id number]]                                   Final result               Green Top (Gel)[[id number]]                                  Final result               Lavender Top[[id number]]                                     Final result               Gold Top - SST[[id number]]                                                              Green Top (No Gel)[[id number]]                                                            Please view results for these tests on the individual orders.    Hemoglobin A1c [[id number]]  (Normal) Collected:  09/05/17 0319    Specimen:  Blood Updated:  09/05/17 0838     Hemoglobin A1C 5.40 %     Narrative:       The American Diabetes Association recommends maintenance of Hemoglobin A1C at 7.0% or lower. Goals for Hemoglobin A1C reduction may need to be modified if hypoglycemia is a problem.    Urine Culture [[id number]]  (Abnormal) Collected:  09/04/17 2133    Specimen:  Urine from Urine, Clean Catch Updated:  09/05/17 1008     Urine Culture >100,000 CFU/mL Gram Negative Bacilli (A)    Clostridium Difficile Toxin [[id number]] Collected:  09/05/17 1020    Specimen:  Stool from Per Rectum Updated:  09/05/17 1141    Narrative:       The following orders were created for panel order Clostridium  Difficile Toxin.  Procedure                               Abnormality         Status                     ---------                               -----------         ------                     Clostridium Difficile To...[[id number]]  Normal              Final result                 Please view results for these tests on the individual orders.    Clostridium Difficile Toxin, PCR [[id number]]  (Normal) Collected:  09/05/17 1020    Specimen:  Stool from Per Rectum Updated:  09/05/17 1141     C. Difficile Toxins by PCR Not Detected    Narrative:         Performance characteristics of test not established for patients <2 years of age.  Negative for Toxigenic C. Difficile    Blood Culture [[id number]]  (Normal) Collected:  09/04/17 2250    Specimen:  Blood from Arm, Left Updated:  09/05/17 1201     Blood Culture No growth at less than 24 hours    Blood Culture [[id number]]  (Normal) Collected:  09/04/17 2250    Specimen:  Blood from Hand, Left Updated:  09/05/17 1201     Blood Culture No growth at less than 24 hours          Imaging Results (last 24 hours)     Procedure Component Value Units Date/Time    XR Chest 1 View [[id number]]  (Abnormal) Collected:  09/04/17 2235     Updated:  09/04/17 2332    Narrative:       EXAM:  XR Chest, 1 View    CLINICAL HISTORY:  46 years old, male; Signs and symptoms; Other: Sepsis protocol C/O difficulty   urinating; Additional info: Severe sepsis protocol    TECHNIQUE:  Frontal view of the chest.    COMPARISON:  No relevant prior studies available.    FINDINGS:  Lungs:  Normal.  No consolidation.  Pleural space:  Normal.  No pneumothorax.  Heart:  Normal.  No cardiomegaly.  Mediastinum:  Normal.  Bones/joints:  Normal.      Impression:         Normal chest radiograph.    THIS DOCUMENT HAS BEEN ELECTRONICALLY SIGNED BY SHALINI MAC MD    CT Abdomen Pelvis With Contrast [[id number]]  (Abnormal) Collected:  09/05/17 0056     Updated:  09/05/17 0135    Narrative:       EXAM:  CT Abdomen and Pelvis  With Intravenous Contrast    CLINICAL HISTORY:  46 years old, male; Sepsis, unspecified organism; Acute cystitis with   hematuria; Signs and symptoms; Other: See notes; Additional info: Urinary   retention    TECHNIQUE:  Axial computed tomography images of the abdomen and pelvis with intravenous   contrast.  All CT scans at this facility use one or more dose reduction   techniques, viz.: automated exposure control; ma/kV adjustment per patient size   (including targeted exams where dose is matched to indication; i.e. head); or   iterative reconstruction technique.  Coronal reformatted images were created and reviewed.    CONTRAST:  100 mL of isovue 300 administered intravenously.    COMPARISON:  No relevant prior studies available.    FINDINGS:  Lower thorax: No acute findings.    ABDOMEN:  Liver:  Hepatic steatosis.  Gallbladder and bile ducts:  Normal.  Pancreas:  Normal.  Spleen:  Normal.  Adrenals:  Normal.  Kidneys and ureters:  Normal.  Stomach and bowel:  Normal.  Appendix:  Appendix normal.    PELVIS:  Bladder:  Normal.  Reproductive:  Prostate gland is mildly enlarged, measuring 4.5 cm in   anteroposterior dimension.    ABDOMEN and PELVIS:  Intraperitoneal space:  Normal.  No free air.  No significant fluid collection.  Bones/joints:  No acute fracture.  No dislocation.  Soft tissues:  Normal.  Vasculature:  Normal.  No abdominal aortic aneurysm.  Lymph nodes:  Several small lymph nodes within the retroperitoneum, likely   reactive.      Impression:         1. No acute findings.    2. Non-acute findings are described above.    THIS DOCUMENT HAS BEEN ELECTRONICALLY SIGNED BY SHALINI MAC MD           Physician Progress Notes (last 24 hours) (Notes from 9/4/2017  2:58 PM through 9/5/2017  2:58 PM)      Natalia Spivey DO at 9/5/2017 11:19 AM  Version 1 of 1             [facility]  INPATIENT PROGRESS NOTE    Date of Admission: 9/4/2017  Length of Stay: 0  Primary Care  Physician: Tod Medellin MD    Subjective   CC: f/u dysuria, sepsis  HPI:  Patient resting in bed. No complaints. Continues to have dysuria. Denies fever, endorses chills.    Review Of Systems:   Review of Systems  Negative for fever,chills, chest pain, headache, shortness of breath, nausea, vomiting, diarrhea, constipation    Objective      Temp:  [98.5 °F (36.9 °C)-100.5 °F (38.1 °C)] 98.5 °F (36.9 °C)  Heart Rate:  [102-127] 114  Resp:  [20-26] 20  BP: (112-145)/(55-89) 128/73  Physical Exam   GEN:Patient is WD/WN, alert and oriented x3, in NAD, he is resting comfortably in bed  HEENT: AT/NC  NECK: Neck is without mass or JVD  CV: RRR no m/r/g, S1,S2  LUNGS: CTAB no w/r/r  ABD: BSx4, NT/ND, no suprapubic tenderness  SKIN: no rashes, lesions  NEURO: no focal deficits  PSYCH: Mood is appropriate  EXT: no c/c/e/e    Results Review:    I have reviewed the labs, radiology results and diagnostic studies.      Results from last 7 days  Lab Units 09/05/17  0319   WBC 10*3/mm3 20.93*   HEMOGLOBIN g/dL 13.2   PLATELETS 10*3/mm3 212       Results from last 7 days  Lab Units 09/05/17  0319   SODIUM mmol/L 139   POTASSIUM mmol/L 4.3   CHLORIDE mmol/L 104   CO2 mmol/L 25.0   BUN mg/dL 13   CREATININE mg/dL 1.30   GLUCOSE mg/dL 140*   CALCIUM mg/dL 8.5*       Culture Data: Cultures:    Urine Culture   Date Value Ref Range Status   09/04/2017 >100,000 CFU/mL Gram Negative Bacilli (A)  Preliminary       Radiology Data:     I have reviewed the medications.    Assessment/Plan     Problem List  Hospital Problem List     * (Principal)Sepsis    GERD (gastroesophageal reflux disease)    Complicated UTI (urinary tract infection)    Hypertension    Hyperglycemia        Assessment/Plan:  1. Sepsis secondary to Urinary Tract Infection:  - UA showing pyuria with +leuk esterase  - urine cx grown GN Bacilli, will stop Vanc, continue Zosyn and adjust abx pending culture results  - lactate normalized  - CT abd/pelvis unremarkable - no obvious  outflow obstruction noted  - leukocytosis persists, afebrile, hemodynamically stable    2. HTN:  - holding home BP meds, normotensive    3. GERD  - PPI    DVT prophylaxis: heparin  Discharge Planning: I expect patient to be discharged TBD    Natalia Spivey DO   09/05/17   11:19 AM         Electronically signed by Natalia Spivey DO at 9/5/2017 11:28 AM        Consult Notes (last 24 hours) (Notes from 9/4/2017  2:58 PM through 9/5/2017  2:58 PM)     No notes of this type exist for this encounter.

## 2017-09-05 NOTE — PROGRESS NOTES
Discharge Planning Assessment  [facility]     Patient Name: Joey Quintero  MRN: [id number]  Today's Date: 9/5/2017    Admit Date: 9/4/2017          Discharge Needs Assessment       09/05/17 9934    Living Environment    Lives With child(lori), dependent;spouse    Living Arrangements house    Home Accessibility bed and bath on same level;stairs within home;no concerns    Type of Financial/Environmental Concern none    Transportation Available car    Living Environment Comment CM spoke with pt in room with permission in regards to discharge planning Pt resides in [facility] [place] with spouse, Clarisa, and 2 children, ages 14 and 16. Pt is independent of ADLs prior to admission.  Pt lives in a 2 story house, but states master BR on 1st level and he doesn't have to go upstairs.     Living Environment    Provides Primary Care For no one    Quality Of Family Relationships supportive    Able to Return to Prior Living Arrangements yes    Living Arrangement Comments Plan is home with family when medically ready for discharge.     Discharge Needs Assessment    Concerns To Be Addressed denies needs/concerns at this time    Readmission Within The Last 30 Days no previous admission in last 30 days    Outpatient/Agency/Support Group Needs --   Pt states has used HH in past, but unsure of agency.     Anticipated Changes Related to Illness other (see comments)   Denies discharge needs.    Equipment Currently Used at Home none    Equipment Needed After Discharge none    Discharge Disposition still a patient    Discharge Contact Information if Applicable Clarisa Quintero(spouse): [phone]    Discharge Planning Comments Pt has [employer] Insurance and denies recent changes in insurance. Pt states his prescriptions require a copay, but copay is affordable. Pt uses [facility] pharmacy in [place] and denies difficulty obtaining meds. [facility] for [facility]. and spouse will help him. Pt denies  discharge needs. CM will cont to follow.            Discharge Plan       09/05/17 1521    Case Management/Social Work Plan    Plan discharge plan    Patient/Family In Agreement With Plan yes    Additional Comments Plan is home with family. Denies discharge needs. CM will cont to follow.        Discharge Placement     No information found        Expected Discharge Date and Time     Expected Discharge Date Expected Discharge Time    Sep 7, 2017               Demographic Summary       09/05/17 1513    Primary Care Physician Information    Name Tod Medellin(in [place])            Functional Status       09/05/17 1514    Functional Status Prior    Ambulation 0-->independent    Transferring 0-->independent    Toileting 0-->independent    Bathing 0-->independent    Dressing 0-->independent    Eating 0-->independent    Communication 0-->understands/communicates without difficulty    Swallowing 0-->swallows foods/liquids without difficulty            Psychosocial     None            Abuse/Neglect     None            Legal     None            Substance Abuse     None            Patient Forms     None          Laila Whitney, RICARDO

## 2017-09-05 NOTE — PROGRESS NOTES
Pharmacokinetic Consult - Vancomycin Dosing  Joey Quintero is a 46 y.o. male who has been consulted for vancomycin dosing for bacteremia (goal trough 15-20 mcg/mL).    Current Antimicrobial Therapy  Vancomycin 1500mg IV q12h  Zosyn 3.375 gm IV q6h    Allergies  Verapamil    Relevant clinical data and objective history reviewed:  Creatinine   Date Value Ref Range Status   09/04/2017 1.20 0.60 - 1.30 mg/dL Final     Estimated Creatinine Clearance: 108.4 mL/min (by C-G formula based on Cr of 1.2).     Patient weight: 287 lb (130 kg)    Asessment/Plan  Will initiate dose at 2000 mg IV once (given in ED 9/5 at 0037)       followed by  Vancomycin 1500mg IV q 12 hours    Vancomycin trough is scheduled 9/6 at 1130 prior to the 4th dose    Asher Prieto [facility]  9/5/2017  1:47 AM

## 2017-09-05 NOTE — PLAN OF CARE
Problem: Patient Care Overview (Adult)  Goal: Plan of Care Review  Outcome: Ongoing (interventions implemented as appropriate)    09/05/17 0212   Coping/Psychosocial Response Interventions   Plan Of Care Reviewed With patient;spouse   Patient Care Overview   Progress progress toward functional goals as expected       Goal: Discharge Needs Assessment  Outcome: Ongoing (interventions implemented as appropriate)    09/05/17 0212   Discharge Needs Assessment   Concerns To Be Addressed no discharge needs identified   Readmission Within The Last 30 Days no previous admission in last 30 days   Equipment Needed After Discharge none   Current Health   Anticipated Changes Related to Illness none   Self-Care   Equipment Currently Used at Home none   Living Environment   Transportation Available family or friend will provide

## 2017-09-05 NOTE — H&P
"    [facility] Medicine Services  HISTORY AND PHYSICAL    Primary Care Physician: Tod Medellin MD    Subjective     Chief Complaint:  Dysuria    History of Present Illness:   This is a 46 year old male with a past medical history significant for WPW, hypertension, and GERD who presents with nearly 24 hours of dysuria. States he awoke at 2:00 am Monday morning to void when his stream \"suddenly stopped and he couldn't get the rest out\". He tried to returning to bed only to repeatedly get up and find that \"nothing was coming out\". There is associated suprapubic pressure which is relieved with attempts to void. Also noting subjective fever with chills, diaphoresis, nausea without vomiting and non bloody diarrhea. Last bowel movement prior to arrival around 4 hours ago. States he has been on two rounds of Amoxicillin for a tooth abscess within the past month. Denies cough, congestion, SOB, chest pain, penile/testicular pain or discharge. No hematuria. No genital rash or history of prostate issues. No flank pain. Wife at bedside reporting that when she returned home from work this evening, patient appeared to be in acute distress. She was concerned with uncharacteristic pallor and shallow breathing. Patient now presenting to [facility] for further evaluation and treatment.     Emergency Department Evaluation: Septic. Febrile, T-max 100.5. Tachycardic to 127 with stable /70. Blood glucose elevated to 136.0. WBC increased to 20.4. Hematology and chemistry otherwise favorable. Urinalysis positive for acute infection. Negative nitrites. CXR negative for acute process.      Review of Systems   Constitutional: Positive for chills, diaphoresis, fatigue and fever.   HENT: Negative for congestion and trouble swallowing.    Eyes: Negative for photophobia and visual disturbance.   Respiratory: Negative for cough and shortness of breath.    Cardiovascular: Negative for chest pain and leg swelling. " "  Gastrointestinal: Positive for diarrhea and nausea. Negative for abdominal pain, constipation and vomiting.   Endocrine: Negative for cold intolerance and heat intolerance.   Genitourinary: Positive for difficulty urinating, dysuria and frequency. Negative for decreased urine volume, hematuria, penile pain and scrotal swelling.   Musculoskeletal: Negative for back pain and joint swelling.   Skin: Positive for pallor. Negative for rash.   Allergic/Immunologic: Negative for immunocompromised state.   Neurological: Negative for dizziness, syncope and weakness.   Hematological: Negative for adenopathy.   Psychiatric/Behavioral: Negative for agitation and confusion.      Otherwise complete ROS performed and negative except as mentioned in the HPI.    Past Medical History:   Diagnosis Date   • GERD (gastroesophageal reflux disease)    • Hypertension        Past Surgical History:   Procedure Laterality Date   • SHOULDER SURGERY         Fam hx: no known history of prostrate issues    Social History     Social History   • Marital status:      Spouse name: N/A   • Number of children: N/A   • Years of education: N/A     Occupational History   • Not on file.     Social History Main Topics   • Smoking status: Never Smoker   • Smokeless tobacco: Not on file   • Alcohol use Not on file   • Drug use: Not on file   • Sexual activity: Not on file     Other Topics Concern   • Not on file     Social History Narrative       Medications:    (Not in a hospital admission)    Allergies:  Allergies   Allergen Reactions   • Verapamil          Objective     Physical Exam:  Vital Signs: /73  Pulse 110  Temp 100.5 °F (38.1 °C) (Oral)   Resp 26  Ht 72\" (182.9 cm)  Wt 287 lb (130 kg)  SpO2 95%  BMI 38.92 kg/m2  Physical Exam  Constitutional: in no acute distress but toxic appearing  Eyes: PERRLA, sclerae anicteric, no conjunctival injection  HENT: NCAT, mucous membranes moist  Neck: supple, no thyromegaly, no lymphadenopathy, " trachea midline  Respiratory: Clear to auscultation bilaterally, nonlabored respirations, diminished breath sounds  Cardiovascular: RRR, no murmurs, rubs, or gallops, palpable pedal pulses bilaterally  Gastrointestinal: Positive bowel sounds, soft, nontender, nondistended, morbidly obese  Musculoskeletal: No bilateral ankle edema, no clubbing or cyanosis to bilateral lower extremities  Psychiatric: oriented x 3, appropriate affect, cooperative  Neurologic: Strength symmetric in all extremities, Cranial Nerves grossly intact to confrontation, speech clear  Derm: no rashes    Results Reviewed:    Results from last 7 days  Lab Units 09/04/17  2250   WBC 10*3/mm3 20.41*   HEMOGLOBIN g/dL 13.8   PLATELETS 10*3/mm3 229       Results from last 7 days  Lab Units 09/04/17  2250   SODIUM mmol/L 139   POTASSIUM mmol/L 3.9   CO2 mmol/L 25.0   CREATININE mg/dL 1.20   GLUCOSE mg/dL 136*   CALCIUM mg/dL 8.9       I have personally reviewed and interpreted available lab data, radiology studies and ECG obtained at time of admission.     Assessment / Plan     Problem List:   Hospital Problem List     * (Principal)Sepsis    Complicated UTI (urinary tract infection)    Hyperglycemia    Hypertension    GERD (gastroesophageal reflux disease)          Plan:  1. Sepsis secondary to acute, complicated UTI:  - Vitals improved and stable after 2L bolus in ED. Monitor. Lactic acid 1.9  - complicated secondary to urine retention. Consider urology consult in am  - administered Vanc and Zosyn in ED. Will continue and await blood and urine cultuers  - no history of resistant angie  - saline 125 cc/hr  - considering CT abdomen  - am labs    2. Hyperglycemia:  - no history of DM, possible reactive to acute inflammatory process  - in the setting of morbid obesity will check A1c    3. Hypertension:  - BP labile. Hold ACE-I for now    Patient stable for admission to TELEMETRY. Labs and vitals routine per nursing.      DVT prophylaxis: subcutaneous  heparin  Code Status: full  Admission Status: Patient will be admitted to (LEXOBS or LEXINPT)     Vick Martinez PA-C 09/05/17 12:40 AM      Brief Attending Admission Note     Mr. Quintero is a 46 yoM with hx of HTN, WPW, GERD, he presents to [facility] accompanied by his wife with one day of  concerns of increased urinary hesitancy, trouble voiding that started last night at 2am, patient however went to work today continued to have the same issues, but on getting back home he looked sick and was incoherent per his wife hence his presentation to the ED.This has been associated with nausea with no vomiting, chills no fever, he denies having any prostrate issues      Attending Physical Exam:  Constitutional: no acute distress, awake, alert  Eyes: PERRLA, sclerae anicteric, no conjunctival injection  HENT: NCAT, mucous membranes moist  Neck: supple, no thyromegaly, no lymphadenopathy, trachea midline  Respiratory: Clear to auscultation bilaterally, nonlabored respirations   Cardiovascular: RRR, no murmurs, rubs, or gallops, palpable pedal pulses bilaterally  Gastrointestinal: Obese, positive bowel sounds, soft, nontender, nondistended  Musculoskeletal: No bilateral ankle edema, no clubbing or cyanosis to bilateral lower extremities  Psychiatric: oriented x 3, appropriate affect, cooperative  Neurologic: Strength symmetric in all extremities, Cranial Nerves grossly intact to confrontation, speech clear  Derm: no rashes     Brief Assessment/Plan :  46 yoM with hx of HTN p/w mild confusion, urinary urgency, admitted with metabolic encephalopathy that has since resolved, suspect infectious from UTI; sepsis per criteria with leukocytosis, tachycardia and fever, source likely UTI thus complicated. Getting CT abd/pelvis to r/o any obstructive etiology considering patient reports trouble voiding. He was started on abx, will continue for now, vanc and zosyn, de-escalate as cultures dictate, continue IVF, repeat labs in AM.     See  above for further detailed assessment and plan developed with [facility] which I have reviewed and/or edited.     I believe this patient meets INPATIENT status due to the need for care which can only be reasonably provided in an hospital setting such as aggressive/expedited ancillary services and/or consultation services, the necessity for IV medications, close physician monitoring and/or the possible need for procedures.  In such, I feel patient’s risk for adverse outcomes and need for care warrant INPATIENT evaluation and predict the patient’s care encounter to likely last beyond 2 midnights.    Isabel Butterfield MD  09/05/17  1:09 AM

## 2017-09-05 NOTE — ED PROVIDER NOTES
Subjective   HPI Comments: Mr. Joey Quintero is a 46 y.o. male who presents to the ED with c/o difficulty urinating. He notes at around 0300 yesterday morning he had trouble voiding and emptying his bladder. He does note of some urinary frequency but only is able to void less than 100 mL of urine at a time. He denies any pain associated with his difficulty urinating. He does note of some nausea, chills, loss of appetite, generalized weakness, and diarrhea at 1800 but denies any vomiting, fevers, or any other acute sx at this time. He has a hx of vocal cord dysfunction and WPW.    Wife notes that after he came home from work today, he looked very pale, was laboring to breathe, and just 'looked out of it.' They went to a [facility] and was told to come to the ED for evaluation.    Patient is a 46 y.o. male presenting with difficulty urinating.   History provided by:  Patient  Difficulty Urinating   Presenting symptoms: no dysuria and no penile pain    Relieved by:  None tried  Worsened by:  Nothing  Ineffective treatments:  None tried  Associated symptoms: diarrhea, nausea and urinary frequency    Associated symptoms: no abdominal pain, no fever, no flank pain and no vomiting        Review of Systems   Constitutional: Positive for appetite change (Decreased), chills and fatigue. Negative for fever.   Respiratory: Positive for shortness of breath (Per wife).    Gastrointestinal: Positive for diarrhea and nausea. Negative for abdominal pain and vomiting.   Genitourinary: Positive for decreased urine volume, difficulty urinating and frequency. Negative for dysuria, flank pain and penile pain.   Skin: Positive for pallor (Per wife).   Neurological: Negative for light-headedness.   All other systems reviewed and are negative.      Past Medical History:   Diagnosis Date   • GERD (gastroesophageal reflux disease)    • Hypertension        Allergies   Allergen Reactions   • Verapamil        Past Surgical History:   Procedure Laterality  Date   • SHOULDER SURGERY         History reviewed. No pertinent family history.    Social History     Social History   • Marital status:      Spouse name: N/A   • Number of children: N/A   • Years of education: N/A     Social History Main Topics   • Smoking status: Never Smoker   • Smokeless tobacco: None   • Alcohol use None   • Drug use: None   • Sexual activity: Not Asked     Other Topics Concern   • None     Social History Narrative         Objective   Physical Exam   Constitutional: He is oriented to person, place, and time. He appears well-developed and well-nourished. No distress.   HENT:   Head: Normocephalic and atraumatic.   Nose: Nose normal.   Eyes: Conjunctivae are normal. No scleral icterus.   Neck: Normal range of motion. Neck supple.   Cardiovascular: Regular rhythm, normal heart sounds and intact distal pulses.  Tachycardia present.    No murmur heard.  Pulmonary/Chest: Effort normal and breath sounds normal. Tachypnea noted. No respiratory distress.   Abdominal: Soft. There is tenderness (Mild suprapubic discomfort to deep palpation).   Musculoskeletal: Normal range of motion.   Neurological: He is alert and oriented to person, place, and time.   No deficits   Skin: Skin is warm and dry. He is not diaphoretic.   Psychiatric: He has a normal mood and affect. His behavior is normal.   Nursing note and vitals reviewed.      Procedures         ED Course  ED Course   Comment By Time   Dr. Combs discussed case with Dr. Butterfield, hospitalist, who will admit the patient. Mauram Jorge 09/04 3774     Recent Results (from the past 24 hour(s))   Urinalysis With / Culture If Indicated    Collection Time: 09/04/17  9:33 PM   Result Value Ref Range    Color, UA Yellow Yellow, Straw    Appearance, UA Cloudy (A) Clear    pH, UA 8.5 (H) 5.0 - 8.0    Specific Gravity, UA 1.021 1.001 - 1.030    Glucose, UA Negative Negative    Ketones, UA Negative Negative    Bilirubin, UA Negative Negative    Blood, UA Large (3+)  (A) Negative    Protein, UA Trace (A) Negative    Leuk Esterase, UA Moderate (2+) (A) Negative    Nitrite, UA Negative Negative    Urobilinogen, UA 1.0 E.U./dL 0.2 - 1.0 E.U./dL   Urinalysis, Microscopic Only    Collection Time: 09/04/17  9:33 PM   Result Value Ref Range    RBC, UA Too Numerous to Count (A) None Seen, 0-2 /HPF    WBC, UA Too Numerous to Count (A) None Seen /HPF    Bacteria, UA Trace None Seen, Trace /HPF    Squamous Epithelial Cells, UA None Seen None Seen, 0-2 /HPF    Hyaline Casts, UA 0-6 0 - 6 /LPF    Methodology Automated Microscopy    Urine Culture    Collection Time: 09/04/17  9:33 PM   Result Value Ref Range    Urine Culture Culture in progress    Comprehensive Metabolic Panel    Collection Time: 09/04/17 10:50 PM   Result Value Ref Range    Glucose 136 (H) 70 - 100 mg/dL    BUN 14 9 - 23 mg/dL    Creatinine 1.20 0.60 - 1.30 mg/dL    Sodium 139 132 - 146 mmol/L    Potassium 3.9 3.5 - 5.5 mmol/L    Chloride 103 99 - 109 mmol/L    CO2 25.0 20.0 - 31.0 mmol/L    Calcium 8.9 8.7 - 10.4 mg/dL    Total Protein 7.3 5.7 - 8.2 g/dL    Albumin 4.30 3.20 - 4.80 g/dL    ALT (SGPT) 62 (H) 7 - 40 U/L    AST (SGOT) 32 0 - 33 U/L    Alkaline Phosphatase 63 25 - 100 U/L    Total Bilirubin 0.8 0.3 - 1.2 mg/dL    eGFR Non African Amer 65 >60 mL/min/1.73    Globulin 3.0 gm/dL    A/G Ratio 1.4 (L) 1.5 - 2.5 g/dL    BUN/Creatinine Ratio 11.7 7.0 - 25.0    Anion Gap 11.0 3.0 - 11.0 mmol/L   Protime-INR    Collection Time: 09/04/17 10:50 PM   Result Value Ref Range    Protime 11.3 9.6 - 11.5 Seconds    INR 1.04    aPTT    Collection Time: 09/04/17 10:50 PM   Result Value Ref Range    PTT 29.1 24.0 - 31.0 seconds   Magnesium    Collection Time: 09/04/17 10:50 PM   Result Value Ref Range    Magnesium 1.7 1.3 - 2.7 mg/dL   Phosphorus    Collection Time: 09/04/17 10:50 PM   Result Value Ref Range    Phosphorus 2.4 2.4 - 5.1 mg/dL   Calcium, Ionized    Collection Time: 09/04/17 10:50 PM   Result Value Ref Range     Ionized Calcium 1.14 1.12 - 1.32 mmol/L   Lactic Acid, Plasma    Collection Time: 09/04/17 10:50 PM   Result Value Ref Range    Lactate 1.9 0.5 - 2.0 mmol/L   C-reactive Protein    Collection Time: 09/04/17 10:50 PM   Result Value Ref Range    C-Reactive Protein 5.16 (H) 0.00 - 1.00 mg/dL   Light Blue Top    Collection Time: 09/04/17 10:50 PM   Result Value Ref Range    Extra Tube hold for add-on    Green Top (Gel)    Collection Time: 09/04/17 10:50 PM   Result Value Ref Range    Extra Tube Hold for add-ons.    Lavender Top    Collection Time: 09/04/17 10:50 PM   Result Value Ref Range    Extra Tube hold for add-on    CBC Auto Differential    Collection Time: 09/04/17 10:50 PM   Result Value Ref Range    WBC 20.41 (H) 3.50 - 10.80 10*3/mm3    RBC 4.74 4.20 - 5.76 10*6/mm3    Hemoglobin 13.8 13.1 - 17.5 g/dL    Hematocrit 41.2 38.9 - 50.9 %    MCV 86.9 80.0 - 99.0 fL    MCH 29.1 27.0 - 31.0 pg    MCHC 33.5 32.0 - 36.0 g/dL    RDW 12.8 11.3 - 14.5 %    RDW-SD 41.0 37.0 - 54.0 fl    MPV 10.0 6.0 - 12.0 fL    Platelets 229 150 - 450 10*3/mm3    Neutrophil % 83.6 (H) 41.0 - 71.0 %    Lymphocyte % 6.3 (L) 24.0 - 44.0 %    Monocyte % 9.4 0.0 - 12.0 %    Eosinophil % 0.1 0.0 - 3.0 %    Basophil % 0.1 0.0 - 1.0 %    Immature Grans % 0.5 0.0 - 0.6 %    Neutrophils, Absolute 17.06 (H) 1.50 - 8.30 10*3/mm3    Lymphocytes, Absolute 1.28 0.60 - 4.80 10*3/mm3    Monocytes, Absolute 1.92 (H) 0.00 - 1.00 10*3/mm3    Eosinophils, Absolute 0.02 0.00 - 0.30 10*3/mm3    Basophils, Absolute 0.03 0.00 - 0.20 10*3/mm3    Immature Grans, Absolute 0.10 (H) 0.00 - 0.03 10*3/mm3   Blood Gas, Arterial    Collection Time: 09/04/17 11:31 PM   Result Value Ref Range    Site Arterial: left radial     Tee's Test N/A     pH, Arterial 7.474 (H) 7.350 - 7.450 pH units    pCO2, Arterial 33.9 (L) 35.0 - 48.0 mm Hg    pO2, Arterial 71.9 (L) 83.0 - 108.0 mm Hg    HCO3, Arterial 24.9 20.0 - 26.0 mmol/L    Base Excess, Arterial 1.7 0.0 - 2.0 mmol/L    O2  Saturation, Arterial 94.5 %    O2 Saturation Calculated 94.5 %    Hemoglobin, Blood Gas 14.0 13.5 - 17.5 g/dL    Hematocrit, Blood Gas 42.9 %    Oxyhemoglobin 94.5 94 - 99 %    Methemoglobin 0.80 0.00 - 1.50 %    Carboxyhemoglobin 1.3 0 - 2 %    CO2 Content 25.9 22 - 33    Barometric Pressure for Blood Gas  mmHg    Modality Room Air     FIO2 21 %   Basic Metabolic Panel    Collection Time: 09/05/17  3:19 AM   Result Value Ref Range    Glucose 140 (H) 70 - 100 mg/dL    BUN 13 9 - 23 mg/dL    Creatinine 1.30 0.60 - 1.30 mg/dL    Sodium 139 132 - 146 mmol/L    Potassium 4.3 3.5 - 5.5 mmol/L    Chloride 104 99 - 109 mmol/L    CO2 25.0 20.0 - 31.0 mmol/L    Calcium 8.5 (L) 8.7 - 10.4 mg/dL    eGFR Non African Amer 60 (L) >60 mL/min/1.73    BUN/Creatinine Ratio 10.0 7.0 - 25.0    Anion Gap 10.0 3.0 - 11.0 mmol/L   CBC Auto Differential    Collection Time: 09/05/17  3:19 AM   Result Value Ref Range    WBC 20.93 (H) 3.50 - 10.80 10*3/mm3    RBC 4.58 4.20 - 5.76 10*6/mm3    Hemoglobin 13.2 13.1 - 17.5 g/dL    Hematocrit 40.9 38.9 - 50.9 %    MCV 89.3 80.0 - 99.0 fL    MCH 28.8 27.0 - 31.0 pg    MCHC 32.3 32.0 - 36.0 g/dL    RDW 13.2 11.3 - 14.5 %    RDW-SD 42.9 37.0 - 54.0 fl    MPV 10.5 6.0 - 12.0 fL    Platelets 212 150 - 450 10*3/mm3    Neutrophil % 84.6 (H) 41.0 - 71.0 %    Lymphocyte % 6.9 (L) 24.0 - 44.0 %    Monocyte % 8.0 0.0 - 12.0 %    Eosinophil % 0.0 0.0 - 3.0 %    Basophil % 0.1 0.0 - 1.0 %    Immature Grans % 0.4 0.0 - 0.6 %    Neutrophils, Absolute 17.67 (H) 1.50 - 8.30 10*3/mm3    Lymphocytes, Absolute 1.45 0.60 - 4.80 10*3/mm3    Monocytes, Absolute 1.68 (H) 0.00 - 1.00 10*3/mm3    Eosinophils, Absolute 0.01 0.00 - 0.30 10*3/mm3    Basophils, Absolute 0.03 0.00 - 0.20 10*3/mm3    Immature Grans, Absolute 0.09 (H) 0.00 - 0.03 10*3/mm3   Hemoglobin A1c    Collection Time: 09/05/17  3:19 AM   Result Value Ref Range    Hemoglobin A1C 5.40 4.80 - 5.60 %     Note: In addition to lab results from this visit, the  "labs listed above may include labs taken at another facility or during a different encounter within the last 24 hours. Please correlate lab times with ED admission and discharge times for further clarification of the services performed during this visit.    CT Abdomen Pelvis With Contrast   Final Result   Abnormal      1. No acute findings.      2. Non-acute findings are described above.      THIS DOCUMENT HAS BEEN ELECTRONICALLY SIGNED BY SHALINI MAC MD      XR Chest 1 View   Final Result   Abnormal      Normal chest radiograph.      THIS DOCUMENT HAS BEEN ELECTRONICALLY SIGNED BY SHALINI MAC MD        Vitals:    09/05/17 0030 09/05/17 0100 09/05/17 0559 09/05/17 0710   BP: 125/73 112/73 131/59 128/73   BP Location:  Right arm Right arm Right arm   Patient Position:  Lying Lying Lying   Pulse: 110 102  114   Resp:  22 20 20   Temp:  99.4 °F (37.4 °C) 99.1 °F (37.3 °C)    TempSrc:  Oral Oral    SpO2: 95%      Weight:  289 lb (131 kg)     Height:  72\" (182.9 cm)       Medications   sodium chloride 0.9 % flush 10 mL (not administered)   sodium chloride 0.9 % flush 1-10 mL (not administered)   heparin (porcine) 5000 UNIT/ML injection 5,000 Units (5,000 Units Subcutaneous Given 9/5/17 0847)   sodium chloride 0.9 % infusion (125 mL/hr Intravenous New Bag 9/5/17 0258)   acetaminophen (TYLENOL) tablet 650 mg (650 mg Oral Given 9/5/17 0719)   ondansetron (ZOFRAN) tablet 4 mg (not administered)     Or   ondansetron (ZOFRAN) injection 4 mg (not administered)   melatonin sublingual tablet 5 mg (not administered)   Pharmacy to dose vancomycin (not administered)   piperacillin-tazobactam (ZOSYN) 3.375 g/100 mL 0.9% NS IVPB (mbp) (3.375 g Intravenous New Bag 9/5/17 0552)   pantoprazole (PROTONIX) EC tablet 40 mg (40 mg Oral Given 9/5/17 0552)   vancomycin 1500 mg/500 mL 0.9% NS IVPB ([facility]) (not administered)   ! Vancomycin trough 9/6 at 1130 (not administered)   sodium chloride 0.9 % bolus 2,000 mL (2,000 mL Intravenous New Bag " 9/4/17 2348)   piperacillin-tazobactam (ZOSYN) 4.5 g/100 mL 0.9% NS IVPB (mbp) (0 g Intravenous Stopped 9/5/17 0032)   vancomycin 2000 mg/500 mL 0.9% NS IVPB ([facility]) (2,000 mg Intravenous New Bag 9/5/17 0037)   Morphine sulfate (PF) injection 4 mg (4 mg Intravenous Given 9/4/17 2354)   iopamidol (ISOVUE-300) 61 % injection 100 mL (100 mL Intravenous Given 9/5/17 0110)     ECG/EMG Results (last 24 hours)     ** No results found for the last 24 hours. **                        [facility]    Final diagnoses:   Acute cystitis with hematuria   Sepsis, due to unspecified organism       Documentation assistance provided by patsy KAUFMAN.  Information recorded by the scribe was done at my direction and has been verified and validated by me.     Fernanda Kaufman  09/04/17 2225       Parish Combs DO  09/05/17 4203

## 2017-09-05 NOTE — PROGRESS NOTES
[facility] Medicine Services  INPATIENT PROGRESS NOTE    Date of Admission: 9/4/2017  Length of Stay: 0  Primary Care Physician: Tod Medellin MD    Subjective   CC: f/u dysuria, sepsis  HPI:  Patient resting in bed. No complaints. Continues to have dysuria. Denies fever, endorses chills.    Review Of Systems:   Review of Systems  Negative for fever,chills, chest pain, headache, shortness of breath, nausea, vomiting, diarrhea, constipation    Objective      Temp:  [98.5 °F (36.9 °C)-100.5 °F (38.1 °C)] 98.5 °F (36.9 °C)  Heart Rate:  [102-127] 114  Resp:  [20-26] 20  BP: (112-145)/(55-89) 128/73  Physical Exam   GEN:Patient is WD/WN, alert and oriented x3, in NAD, he is resting comfortably in bed  HEENT: AT/NC  NECK: Neck is without mass or JVD  CV: RRR no m/r/g, S1,S2  LUNGS: CTAB no w/r/r  ABD: BSx4, NT/ND, no suprapubic tenderness  SKIN: no rashes, lesions  NEURO: no focal deficits  PSYCH: Mood is appropriate  EXT: no c/c/e/e    Results Review:    I have reviewed the labs, radiology results and diagnostic studies.      Results from last 7 days  Lab Units 09/05/17  0319   WBC 10*3/mm3 20.93*   HEMOGLOBIN g/dL 13.2   PLATELETS 10*3/mm3 212       Results from last 7 days  Lab Units 09/05/17  0319   SODIUM mmol/L 139   POTASSIUM mmol/L 4.3   CHLORIDE mmol/L 104   CO2 mmol/L 25.0   BUN mg/dL 13   CREATININE mg/dL 1.30   GLUCOSE mg/dL 140*   CALCIUM mg/dL 8.5*       Culture Data: Cultures:    Urine Culture   Date Value Ref Range Status   09/04/2017 >100,000 CFU/mL Gram Negative Bacilli (A)  Preliminary       Radiology Data:     I have reviewed the medications.    Assessment/Plan     Problem List  Hospital Problem List     * (Principal)Sepsis    GERD (gastroesophageal reflux disease)    Complicated UTI (urinary tract infection)    Hypertension    Hyperglycemia        Assessment/Plan:  1. Sepsis secondary to Urinary Tract Infection:  - UA showing pyuria with +leuk esterase  - urine cx grown  GN Bacilli, will stop Vanc, continue Zosyn and adjust abx pending culture results  - lactate normalized  - CT abd/pelvis unremarkable - no obvious outflow obstruction noted  - leukocytosis persists, afebrile, hemodynamically stable    2. HTN:  - holding home BP meds, normotensive    3. GERD  - PPI    DVT prophylaxis: heparin  Discharge Planning: I expect patient to be discharged HORACIO Spivey DO   09/05/17   11:19 AM

## 2017-09-06 LAB
ANION GAP SERPL CALCULATED.3IONS-SCNC: 7 MMOL/L (ref 3–11)
BACTERIA SPEC AEROBE CULT: ABNORMAL
BASOPHILS # BLD AUTO: 0.03 10*3/MM3 (ref 0–0.2)
BASOPHILS NFR BLD AUTO: 0.2 % (ref 0–1)
BUN BLD-MCNC: 13 MG/DL (ref 9–23)
BUN/CREAT SERPL: 10.8 (ref 7–25)
CALCIUM SPEC-SCNC: 8.4 MG/DL (ref 8.7–10.4)
CHLORIDE SERPL-SCNC: 105 MMOL/L (ref 99–109)
CO2 SERPL-SCNC: 25 MMOL/L (ref 20–31)
CREAT BLD-MCNC: 1.2 MG/DL (ref 0.6–1.3)
DEPRECATED RDW RBC AUTO: 42.3 FL (ref 37–54)
EOSINOPHIL # BLD AUTO: 0.06 10*3/MM3 (ref 0–0.3)
EOSINOPHIL NFR BLD AUTO: 0.3 % (ref 0–3)
ERYTHROCYTE [DISTWIDTH] IN BLOOD BY AUTOMATED COUNT: 13.1 % (ref 11.3–14.5)
GFR SERPL CREATININE-BSD FRML MDRD: 65 ML/MIN/1.73
GLUCOSE BLD-MCNC: 116 MG/DL (ref 70–100)
HCT VFR BLD AUTO: 38.3 % (ref 38.9–50.9)
HGB BLD-MCNC: 12.3 G/DL (ref 13.1–17.5)
IMM GRANULOCYTES # BLD: 0.07 10*3/MM3 (ref 0–0.03)
IMM GRANULOCYTES NFR BLD: 0.4 % (ref 0–0.6)
LYMPHOCYTES # BLD AUTO: 1.4 10*3/MM3 (ref 0.6–4.8)
LYMPHOCYTES NFR BLD AUTO: 7.9 % (ref 24–44)
MCH RBC QN AUTO: 28.6 PG (ref 27–31)
MCHC RBC AUTO-ENTMCNC: 32.1 G/DL (ref 32–36)
MCV RBC AUTO: 89.1 FL (ref 80–99)
MONOCYTES # BLD AUTO: 1.6 10*3/MM3 (ref 0–1)
MONOCYTES NFR BLD AUTO: 9 % (ref 0–12)
NEUTROPHILS # BLD AUTO: 14.54 10*3/MM3 (ref 1.5–8.3)
NEUTROPHILS NFR BLD AUTO: 82.2 % (ref 41–71)
PLATELET # BLD AUTO: 179 10*3/MM3 (ref 150–450)
PMV BLD AUTO: 10.8 FL (ref 6–12)
POTASSIUM BLD-SCNC: 4.1 MMOL/L (ref 3.5–5.5)
RBC # BLD AUTO: 4.3 10*6/MM3 (ref 4.2–5.76)
SODIUM BLD-SCNC: 137 MMOL/L (ref 132–146)
WBC NRBC COR # BLD: 17.7 10*3/MM3 (ref 3.5–10.8)

## 2017-09-06 PROCEDURE — 25010000002 PIPERACILLIN-TAZOBACTAM: Performed by: PHYSICIAN ASSISTANT

## 2017-09-06 PROCEDURE — 25010000002 CEFTRIAXONE PER 250 MG: Performed by: INTERNAL MEDICINE

## 2017-09-06 PROCEDURE — 99233 SBSQ HOSP IP/OBS HIGH 50: CPT | Performed by: INTERNAL MEDICINE

## 2017-09-06 PROCEDURE — 84154 ASSAY OF PSA FREE: CPT | Performed by: INTERNAL MEDICINE

## 2017-09-06 PROCEDURE — 85025 COMPLETE CBC W/AUTO DIFF WBC: CPT | Performed by: INTERNAL MEDICINE

## 2017-09-06 PROCEDURE — 25010000002 HEPARIN (PORCINE) PER 1000 UNITS: Performed by: PHYSICIAN ASSISTANT

## 2017-09-06 PROCEDURE — 84153 ASSAY OF PSA TOTAL: CPT | Performed by: INTERNAL MEDICINE

## 2017-09-06 PROCEDURE — 80048 BASIC METABOLIC PNL TOTAL CA: CPT | Performed by: INTERNAL MEDICINE

## 2017-09-06 RX ORDER — LISINOPRIL 20 MG/1
20 TABLET ORAL DAILY
Status: DISCONTINUED | OUTPATIENT
Start: 2017-09-06 | End: 2017-09-07 | Stop reason: HOSPADM

## 2017-09-06 RX ORDER — CEFTRIAXONE SODIUM 1 G/50ML
1 INJECTION, SOLUTION INTRAVENOUS EVERY 24 HOURS
Status: DISCONTINUED | OUTPATIENT
Start: 2017-09-06 | End: 2017-09-07 | Stop reason: HOSPADM

## 2017-09-06 RX ORDER — NAPROXEN 500 MG/1
500 TABLET ORAL 2 TIMES DAILY WITH MEALS
Status: DISCONTINUED | OUTPATIENT
Start: 2017-09-06 | End: 2017-09-07 | Stop reason: HOSPADM

## 2017-09-06 RX ADMIN — ACETAMINOPHEN 650 MG: 325 TABLET, FILM COATED ORAL at 03:04

## 2017-09-06 RX ADMIN — HEPARIN SODIUM 5000 UNITS: 5000 INJECTION, SOLUTION INTRAVENOUS; SUBCUTANEOUS at 21:08

## 2017-09-06 RX ADMIN — PIPERACILLIN SODIUM,TAZOBACTAM SODIUM 3.38 G: 3; .375 INJECTION, POWDER, FOR SOLUTION INTRAVENOUS at 12:03

## 2017-09-06 RX ADMIN — SODIUM CHLORIDE 125 ML/HR: 9 INJECTION, SOLUTION INTRAVENOUS at 16:38

## 2017-09-06 RX ADMIN — PIPERACILLIN SODIUM,TAZOBACTAM SODIUM 3.38 G: 3; .375 INJECTION, POWDER, FOR SOLUTION INTRAVENOUS at 06:09

## 2017-09-06 RX ADMIN — DICLOFENAC SODIUM AND MISOPROSTOL 1 TABLET: 75; .2 TABLET, DELAYED RELEASE ORAL at 16:37

## 2017-09-06 RX ADMIN — ACETAMINOPHEN 650 MG: 325 TABLET, FILM COATED ORAL at 13:48

## 2017-09-06 RX ADMIN — LISINOPRIL 20 MG: 20 TABLET ORAL at 15:02

## 2017-09-06 RX ADMIN — NAPROXEN 500 MG: 500 TABLET ORAL at 16:38

## 2017-09-06 RX ADMIN — CEFTRIAXONE SODIUM 1 G: 1 INJECTION, SOLUTION INTRAVENOUS at 16:38

## 2017-09-06 RX ADMIN — PANTOPRAZOLE SODIUM 40 MG: 40 TABLET, DELAYED RELEASE ORAL at 06:09

## 2017-09-06 RX ADMIN — HEPARIN SODIUM 5000 UNITS: 5000 INJECTION, SOLUTION INTRAVENOUS; SUBCUTANEOUS at 08:50

## 2017-09-06 NOTE — PAYOR COMM NOTE
"Joey García (46 y.o. Male)     Date of Birth Social Security Number Address Home Phone MRN    [id number]  [address] [id number] [phone] [id number]    [employer] Marital Status          None        Admission Date Admission Type Admitting Provider Attending Provider Department, Room/Bed    9/4/17 Emergency Tanmay Ríos MD [place], Masoud, MD [facility] 5G, S559/1    Discharge Date Discharge Disposition Discharge Destination                      Attending Provider: Tanmay Ríos MD     Allergies:  Verapamil    Isolation:  None   Infection:  None   Code Status:  FULL    Ht:  72\" (182.9 cm)   Wt:  289 lb (131 kg)    Admission Cmt:  None   Principal Problem:  Sepsis [A41.9]                 Active Insurance as of 9/4/2017     Primary Coverage     Payor Plan Insurance Group Employer/Plan Group    [employer] A[employer] [id number]     Payor Plan Address Payor Plan Phone Number Effective From Effective To    [address] [phone] 1/1/2016     [place] [address]       Subscriber Name Subscriber Birth Date Member ID       CLARISA GARCÍA D 7/4/1975 [id number]                 Emergency Contacts      (Rel.) Home Phone Work Phone Mobile Phone    Clarisa García (Spouse) [phone] -- --            Vital Signs (last 24 hours)       09/05 0700  -  09/06 0659 09/06 0700  -  09/06 1440   Most Recent    Temp (°F) 97.6 -  98.8       97.7 (36.5)    Heart Rate 82 -  114       96    Resp 16 -  20       20    /72 -  146/93       119/72    SpO2 (%) 96 -  99       99          Hospital Medications (active)       Dose Frequency Start End    acetaminophen (TYLENOL) tablet 650 mg 650 mg Every 4 Hours PRN 9/5/2017     Sig - Route: Take 2 tablets by mouth Every 4 (Four) Hours As Needed for Mild Pain . - Oral    cefTRIAXone (ROCEPHIN) IVPB 1 g 1 g Every 24 Hours 9/6/2017     Sig - Route: Infuse 50 mL into a venous catheter Daily. - Intravenous    " "diclofenac-misoprostol (ARTHROTEC 75) 75-0.2 MG EC tablet 1 tablet 1 tablet Every 12 Hours PRN 9/5/2017     Sig - Route: Take 1 tablet by mouth Every 12 (Twelve) Hours As Needed (joint pain). - Oral    heparin (porcine) 5000 UNIT/ML injection 5,000 Units 5,000 Units Every 12 Hours Scheduled 9/5/2017     Sig - Route: Inject 1 mL under the skin Every 12 (Twelve) Hours. - Subcutaneous    lisinopril (PRINIVIL,ZESTRIL) tablet 20 mg 20 mg Daily 9/6/2017     Sig - Route: Take 1 tablet by mouth Daily. - Oral    melatonin sublingual tablet 5 mg 5 mg Nightly PRN 9/5/2017     Sig - Route: Place 1 tablet under the tongue At Night As Needed (sleep). - Sublingual    naproxen (NAPROSYN) tablet 500 mg 500 mg 2 Times Daily With Meals 9/6/2017     Sig - Route: Take 1 tablet by mouth 2 (Two) Times a Day With Meals. - Oral    ondansetron (ZOFRAN) injection 4 mg 4 mg Every 6 Hours PRN 9/5/2017     Sig - Route: Infuse 2 mL into a venous catheter Every 6 (Six) Hours As Needed for Nausea or Vomiting. - Intravenous    Linked Group 1:  \"Or\" Linked Group Details        ondansetron (ZOFRAN) tablet 4 mg 4 mg Every 6 Hours PRN 9/5/2017     Sig - Route: Take 1 tablet by mouth Every 6 (Six) Hours As Needed for Nausea or Vomiting. - Oral    Linked Group 1:  \"Or\" Linked Group Details        pantoprazole (PROTONIX) EC tablet 40 mg 40 mg Every Morning 9/5/2017     Sig - Route: Take 1 tablet by mouth Every Morning. - Oral    sodium chloride 0.9 % flush 1-10 mL 1-10 mL As Needed 9/5/2017     Sig - Route: Infuse 1-10 mL into a venous catheter As Needed for Line Care. - Intravenous    sodium chloride 0.9 % flush 10 mL 10 mL As Needed 9/4/2017     Sig - Route: Infuse 10 mL into a venous catheter As Needed for Line Care. - Intravenous    sodium chloride 0.9 % infusion 125 mL/hr Continuous 9/5/2017     Sig - Route: Infuse 125 mL/hr into a venous catheter Continuous. - Intravenous    piperacillin-tazobactam (ZOSYN) 3.375 g/100 mL 0.9% NS IVPB (mbp) " (Discontinued) 3.375 g Every 6 Hours 9/5/2017 9/6/2017    Sig - Route: Infuse 100 mL into a venous catheter Every 6 (Six) Hours. - Intravenous          Lab Results (last 24 hours)     Procedure Component Value Units Date/Time    Blood Culture [[id number]]  (Normal) Collected:  09/04/17 2250    Specimen:  Blood from Arm, Left Updated:  09/06/17 0004     Blood Culture No growth at 24 hours    Blood Culture [[id number]]  (Normal) Collected:  09/04/17 2250    Specimen:  Blood from Hand, Left Updated:  09/06/17 0004     Blood Culture No growth at 24 hours    CBC & Differential [[id number]] Collected:  09/06/17 0446    Specimen:  Blood Updated:  09/06/17 0640    Narrative:       The following orders were created for panel order CBC & Differential.  Procedure                               Abnormality         Status                     ---------                               -----------         ------                     CBC Auto Differential[[id number]]        Abnormal            Final result                 Please view results for these tests on the individual orders.    CBC Auto Differential [[id number]]  (Abnormal) Collected:  09/06/17 0446    Specimen:  Blood Updated:  09/06/17 0640     WBC 17.70 (H) 10*3/mm3      RBC 4.30 10*6/mm3      Hemoglobin 12.3 (L) g/dL      Hematocrit 38.3 (L) %      MCV 89.1 fL      MCH 28.6 pg      MCHC 32.1 g/dL      RDW 13.1 %      RDW-SD 42.3 fl      MPV 10.8 fL      Platelets 179 10*3/mm3      Neutrophil % 82.2 (H) %      Lymphocyte % 7.9 (L) %      Monocyte % 9.0 %      Eosinophil % 0.3 %      Basophil % 0.2 %      Immature Grans % 0.4 %      Neutrophils, Absolute 14.54 (H) 10*3/mm3      Lymphocytes, Absolute 1.40 10*3/mm3      Monocytes, Absolute 1.60 (H) 10*3/mm3      Eosinophils, Absolute 0.06 10*3/mm3      Basophils, Absolute 0.03 10*3/mm3      Immature Grans, Absolute 0.07 (H) 10*3/mm3     Basic Metabolic Panel [[id number]]  (Abnormal) Collected:  09/06/17 0446    Specimen:  Blood  Updated:  09/06/17 0649     Glucose 116 (H) mg/dL      BUN 13 mg/dL      Creatinine 1.20 mg/dL      Sodium 137 mmol/L      Potassium 4.1 mmol/L      Chloride 105 mmol/L      CO2 25.0 mmol/L      Calcium 8.4 (L) mg/dL      eGFR Non African Amer 65 mL/min/1.73      BUN/Creatinine Ratio 10.8     Anion Gap 7.0 mmol/L     Narrative:       National Kid[employer] Guidelines    Stage     Description        GFR  1         Normal or High     90+  2         Mild decrease      60-89  3         Moderate decrease  30-59  4         Severe decrease    15-29  5         Kidney failure     <15    Urine Culture [[id number]]  (Abnormal)  (Susceptibility) Collected:  09/04/17 2133    Specimen:  Urine from Urine, Clean Catch Updated:  09/06/17 1148     Urine Culture >100,000 CFU/mL Escherichia coli (A)    Susceptibility      Escherichia coli     GISEL     Ampicillin >16 ug/ml Resistant     Ampicillin + Sulbactam <=8/4 ug/ml Susceptible     Aztreonam <=8 ug/ml Susceptible     Cefepime <=8 ug/ml Susceptible     Cefotaxime <=2 ug/ml Susceptible     Ceftriaxone <=8 ug/ml Susceptible     Cefuroxime sodium 8 ug/ml Susceptible     Cephalothin >16 ug/ml Resistant     Ertapenem <=1 ug/ml Susceptible     Gentamicin <=4 ug/ml Susceptible     Levofloxacin <=2 ug/ml Susceptible     Meropenem <=1 ug/ml Susceptible     Nitrofurantoin <=32 ug/ml Susceptible     Piperacillin + Tazobactam <=16 ug/ml Susceptible     Tetracycline <=4 ug/ml Susceptible     Tobramycin <=4 ug/ml Susceptible     Trimethoprim + Sulfamethoxazole <=2/38 ug/ml Susceptible                        Imaging Results (last 24 hours)     ** No results found for the last 24 hours. **           Physician Progress Notes (last 24 hours) (Notes from 9/5/2017  2:40 PM through 9/6/2017  2:40 PM)      Tanmay Ríos MD at 9/6/2017  2:26 PM  Version 1 of 1             [facility] Medicine Services  INPATIENT PROGRESS NOTE    Date of Admission: 9/4/2017  Length of Stay:  1  Primary Care Physician: Tod Medellin MD    Subjective   CC: f/u dysuria, sepsis  Subjective:  Resting in a chair in no acute distress but complains of joint pains in his hands.  Tells me that he is voiding without any difficulty.  no fever or chills.  No nausea, vomiting, diarrhea, abdominal pain.    Review Of Systems:   Review of Systems  Negative for fever,chills, chest pain, headache, shortness of breath, nausea, vomiting, diarrhea, constipation    Objective      Temp:  [97.6 °F (36.4 °C)-98.8 °F (37.1 °C)] 97.7 °F (36.5 °C)  Heart Rate:  [82-96] 96  Resp:  [16-20] 20  BP: (119-146)/(72-93) 119/72  Physical Exam   GEN:Resting in bed in no acute distress, calm and cooperative.  HEENT: PERRLA  NECK: Neck is without mass or JVD, supple.  CV: RRR no m/r/g, S1,S2 present and normal.  LUNGS: CTAB no w/r/r  ABD: Obese, BSx4, NT/ND.  SKIN: no rashes, lesions  NEURO: no focal deficits  PSYCH: Normal mood and affect  EXT: No edema lower extremities, obese.    Results Review:    I have reviewed the labs, radiology results and diagnostic studies.      Results from last 7 days  Lab Units 09/06/17  0446   WBC 10*3/mm3 17.70*   HEMOGLOBIN g/dL 12.3*   PLATELETS 10*3/mm3 179       Results from last 7 days  Lab Units 09/06/17  0446   SODIUM mmol/L 137   POTASSIUM mmol/L 4.1   CHLORIDE mmol/L 105   CO2 mmol/L 25.0   BUN mg/dL 13   CREATININE mg/dL 1.20   GLUCOSE mg/dL 116*   CALCIUM mg/dL 8.4*       Culture Data: Cultures:    Urine Culture   Date Value Ref Range Status   09/04/2017 >100,000 CFU/mL Gram Negative Bacilli (A)  Preliminary       Radiology Data:     I have reviewed the medications.    Assessment/Plan     Problem List  Hospital Problem List     * (Principal)Sepsis    GERD (gastroesophageal reflux disease)    Complicated UTI (urinary tract infection)    Hypertension    Hyperglycemia        Assessment/Plan:  1. Sepsis secondary to Urinary Tract Infection, improved.     1'. UTI with e.coli, on zosyn.    2. HTN:  -  holding home BP meds, normotensive    3. GERD  - PPI    4.  chronic joint pain, takes NSAID    5. Had difficulty voiding, concerned about possible prostate enlargement.    6. Obesity.    PLAN:  - restart BP medication   - naproxen  - postvoid bladder scan  - labs in am  - home tomorrow on oral antibiotics if labs acceptable and patient stable.    DVT prophylaxis: heparin  Discharge Planning: I expect patient to be discharged home in a day or two.    Tanmay Ríos MD   09/06/17   2:26 PM         Electronically signed by Tanmay Ríos MD at 9/6/2017  2:37 PM

## 2017-09-06 NOTE — PLAN OF CARE
Problem: Patient Care Overview (Adult)  Goal: Plan of Care Review  Outcome: Ongoing (interventions implemented as appropriate)    09/06/17 0159   Coping/Psychosocial Response Interventions   Plan Of Care Reviewed With patient;spouse   Patient Care Overview   Progress progress toward functional goals is gradual       Goal: Adult Individualization and Mutuality  Outcome: Ongoing (interventions implemented as appropriate)  Goal: Discharge Needs Assessment  Outcome: Ongoing (interventions implemented as appropriate)    09/05/17 1514   Discharge Needs Assessment   Concerns To Be Addressed denies needs/concerns at this time   Readmission Within The Last 30 Days no previous admission in last 30 days   Equipment Needed After Discharge none   Discharge Disposition still a patient   Discharge Planning Comments Pt has [employer] Insurance and denies recent changes in insurance. Pt states his prescriptions require a copay, but copay is affordable. Pt uses [facility] in [place] and denies difficulty obtaining meds. Pt [facility] medically ready for [facility]. and spouse will help him. Pt denies discharge needs. CM will cont to follow.   Current Health   Outpatient/Agency/Support Group Needs (Pt states has used HH in past, but unsure of agency. )   Anticipated Changes Related to Illness other (see comments)  (Denies discharge needs.)   Self-Care   Equipment Currently Used at Home none   Living Environment   Transportation Available car         Problem: Infection, Risk/Actual (Adult)  Goal: Identify Related Risk Factors and Signs and Symptoms  Outcome: Ongoing (interventions implemented as appropriate)    09/06/17 0159   Infection, Risk/Actual   Infection, Risk/Actual: Related Risk Factors other (see comments)   Signs and Symptoms (Infection, Risk/Actual) lab value changes;pain       Goal: Infection Prevention/Resolution  Outcome: Ongoing (interventions implemented as appropriate)     09/06/17 0159   Infection, Risk/Actual (Adult)   Infection Prevention/Resolution making progress toward outcome

## 2017-09-06 NOTE — PROGRESS NOTES
[facility] Medicine Services  INPATIENT PROGRESS NOTE    Date of Admission: 9/4/2017  Length of Stay: 1  Primary Care Physician: Tod Medellin MD    Subjective   CC: f/u dysuria, sepsis  Subjective:  Resting in a chair in no acute distress but complains of joint pains in his hands.  Tells me that he is voiding without any difficulty.  no fever or chills.  No nausea, vomiting, diarrhea, abdominal pain.    Review Of Systems:   Review of Systems  Negative for fever,chills, chest pain, headache, shortness of breath, nausea, vomiting, diarrhea, constipation    Objective      Temp:  [97.6 °F (36.4 °C)-98.8 °F (37.1 °C)] 97.7 °F (36.5 °C)  Heart Rate:  [82-96] 96  Resp:  [16-20] 20  BP: (119-146)/(72-93) 119/72  Physical Exam   GEN:Resting in bed in no acute distress, calm and cooperative.  HEENT: PERRLA  NECK: Neck is without mass or JVD, supple.  CV: RRR no m/r/g, S1,S2 present and normal.  LUNGS: CTAB no w/r/r  ABD: Obese, BSx4, NT/ND.  SKIN: no rashes, lesions  NEURO: no focal deficits  PSYCH: Normal mood and affect  EXT: No edema lower extremities, obese.    Results Review:    I have reviewed the labs, radiology results and diagnostic studies.      Results from last 7 days  Lab Units 09/06/17  0446   WBC 10*3/mm3 17.70*   HEMOGLOBIN g/dL 12.3*   PLATELETS 10*3/mm3 179       Results from last 7 days  Lab Units 09/06/17  0446   SODIUM mmol/L 137   POTASSIUM mmol/L 4.1   CHLORIDE mmol/L 105   CO2 mmol/L 25.0   BUN mg/dL 13   CREATININE mg/dL 1.20   GLUCOSE mg/dL 116*   CALCIUM mg/dL 8.4*       Culture Data: Cultures:    Urine Culture   Date Value Ref Range Status   09/04/2017 >100,000 CFU/mL Gram Negative Bacilli (A)  Preliminary       Radiology Data:     I have reviewed the medications.    Assessment/Plan     Problem List  Hospital Problem List     * (Principal)Sepsis    GERD (gastroesophageal reflux disease)    Complicated UTI (urinary tract infection)    Hypertension    Hyperglycemia         Assessment/Plan:  1. Sepsis secondary to Urinary Tract Infection, improved.     1'. UTI with e.coli, on zosyn.    2. HTN:  - holding home BP meds, normotensive    3. GERD  - PPI    4.  chronic joint pain, takes NSAID    5. Had difficulty voiding, concerned about possible prostate enlargement.    6. Obesity.    PLAN:  - restart BP medication   - naproxen  - postvoid bladder scan  - labs in am  - home tomorrow on oral antibiotics if labs acceptable and patient stable.    DVT prophylaxis: heparin  Discharge Planning: I expect patient to be discharged home in a day or two.    Tanmay Ríos MD   09/06/17   2:26 PM

## 2017-09-06 NOTE — PLAN OF CARE
Problem: Patient Care Overview (Adult)  Goal: Plan of Care Review  Outcome: Ongoing (interventions implemented as appropriate)    09/06/17 1520   Coping/Psychosocial Response Interventions   Plan Of Care Reviewed With patient;spouse   Patient Care Overview   Progress progress toward functional goals as expected       Goal: Adult Individualization and Mutuality  Outcome: Ongoing (interventions implemented as appropriate)  Goal: Discharge Needs Assessment  Outcome: Ongoing (interventions implemented as appropriate)    09/05/17 1514   Discharge Needs Assessment   Concerns To Be Addressed denies needs/concerns at this time   Readmission Within The Last 30 Days no previous admission in last 30 days   Equipment Needed After Discharge none   Discharge Disposition still a patient   Current Health   Outpatient/Agency/Support Group Needs (Pt states has used HH in past, but unsure of agency. )   Anticipated Changes Related to Illness other (see comments)  (Denies discharge needs.)   Self-Care   Equipment Currently Used at Home none   Living Environment   Transportation Available car         Problem: Infection, Risk/Actual (Adult)  Goal: Identify Related Risk Factors and Signs and Symptoms  Outcome: Ongoing (interventions implemented as appropriate)    09/06/17 1520   Infection, Risk/Actual   Infection, Risk/Actual: Related Risk Factors other (see comments)   Signs and Symptoms (Infection, Risk/Actual) pain;lab value changes       Goal: Infection Prevention/Resolution  Outcome: Ongoing (interventions implemented as appropriate)    09/06/17 1520   Infection, Risk/Actual (Adult)   Infection Prevention/Resolution making progress toward outcome

## 2017-09-07 VITALS
OXYGEN SATURATION: 90 % | BODY MASS INDEX: 39.14 KG/M2 | HEIGHT: 72 IN | RESPIRATION RATE: 18 BRPM | TEMPERATURE: 98.4 F | HEART RATE: 80 BPM | WEIGHT: 289 LBS | SYSTOLIC BLOOD PRESSURE: 136 MMHG | DIASTOLIC BLOOD PRESSURE: 96 MMHG

## 2017-09-07 PROBLEM — A41.9 SEPSIS (HCC): Status: RESOLVED | Noted: 2017-09-05 | Resolved: 2017-09-07

## 2017-09-07 LAB
ANION GAP SERPL CALCULATED.3IONS-SCNC: 8 MMOL/L (ref 3–11)
BASOPHILS # BLD AUTO: 0.03 10*3/MM3 (ref 0–0.2)
BASOPHILS NFR BLD AUTO: 0.3 % (ref 0–1)
BUN BLD-MCNC: 13 MG/DL (ref 9–23)
BUN/CREAT SERPL: 13 (ref 7–25)
CALCIUM SPEC-SCNC: 8.7 MG/DL (ref 8.7–10.4)
CHLORIDE SERPL-SCNC: 105 MMOL/L (ref 99–109)
CO2 SERPL-SCNC: 25 MMOL/L (ref 20–31)
CREAT BLD-MCNC: 1 MG/DL (ref 0.6–1.3)
DEPRECATED RDW RBC AUTO: 40.6 FL (ref 37–54)
EOSINOPHIL # BLD AUTO: 0.17 10*3/MM3 (ref 0–0.3)
EOSINOPHIL NFR BLD AUTO: 1.7 % (ref 0–3)
ERYTHROCYTE [DISTWIDTH] IN BLOOD BY AUTOMATED COUNT: 12.6 % (ref 11.3–14.5)
GFR SERPL CREATININE-BSD FRML MDRD: 81 ML/MIN/1.73
GLUCOSE BLD-MCNC: 94 MG/DL (ref 70–100)
HCT VFR BLD AUTO: 37.9 % (ref 38.9–50.9)
HGB BLD-MCNC: 12.4 G/DL (ref 13.1–17.5)
IMM GRANULOCYTES # BLD: 0.05 10*3/MM3 (ref 0–0.03)
IMM GRANULOCYTES NFR BLD: 0.5 % (ref 0–0.6)
LYMPHOCYTES # BLD AUTO: 1.25 10*3/MM3 (ref 0.6–4.8)
LYMPHOCYTES NFR BLD AUTO: 12.4 % (ref 24–44)
MCH RBC QN AUTO: 28.7 PG (ref 27–31)
MCHC RBC AUTO-ENTMCNC: 32.7 G/DL (ref 32–36)
MCV RBC AUTO: 87.7 FL (ref 80–99)
MONOCYTES # BLD AUTO: 0.86 10*3/MM3 (ref 0–1)
MONOCYTES NFR BLD AUTO: 8.5 % (ref 0–12)
NEUTROPHILS # BLD AUTO: 7.72 10*3/MM3 (ref 1.5–8.3)
NEUTROPHILS NFR BLD AUTO: 76.6 % (ref 41–71)
PLATELET # BLD AUTO: 207 10*3/MM3 (ref 150–450)
PMV BLD AUTO: 10.4 FL (ref 6–12)
POTASSIUM BLD-SCNC: 3.9 MMOL/L (ref 3.5–5.5)
PSA FREE MFR SERPL: 37.8 %
PSA FREE SERPL-MCNC: 4.65 NG/ML
PSA SERPL-MCNC: 12.3 NG/ML (ref 0–4)
RBC # BLD AUTO: 4.32 10*6/MM3 (ref 4.2–5.76)
SODIUM BLD-SCNC: 138 MMOL/L (ref 132–146)
WBC NRBC COR # BLD: 10.08 10*3/MM3 (ref 3.5–10.8)

## 2017-09-07 PROCEDURE — 80048 BASIC METABOLIC PNL TOTAL CA: CPT | Performed by: INTERNAL MEDICINE

## 2017-09-07 PROCEDURE — 99239 HOSP IP/OBS DSCHRG MGMT >30: CPT | Performed by: NURSE PRACTITIONER

## 2017-09-07 PROCEDURE — 85025 COMPLETE CBC W/AUTO DIFF WBC: CPT | Performed by: INTERNAL MEDICINE

## 2017-09-07 PROCEDURE — 25010000002 HEPARIN (PORCINE) PER 1000 UNITS: Performed by: PHYSICIAN ASSISTANT

## 2017-09-07 RX ORDER — CEFUROXIME AXETIL 250 MG/1
500 TABLET ORAL 2 TIMES DAILY
Qty: 28 TABLET | Refills: 0 | Status: SHIPPED | OUTPATIENT
Start: 2017-09-07

## 2017-09-07 RX ADMIN — ACETAMINOPHEN 650 MG: 325 TABLET, FILM COATED ORAL at 11:32

## 2017-09-07 RX ADMIN — LISINOPRIL 20 MG: 20 TABLET ORAL at 08:13

## 2017-09-07 RX ADMIN — PANTOPRAZOLE SODIUM 40 MG: 40 TABLET, DELAYED RELEASE ORAL at 05:01

## 2017-09-07 RX ADMIN — HEPARIN SODIUM 5000 UNITS: 5000 INJECTION, SOLUTION INTRAVENOUS; SUBCUTANEOUS at 08:13

## 2017-09-07 RX ADMIN — NAPROXEN 500 MG: 500 TABLET ORAL at 04:59

## 2017-09-07 RX ADMIN — SODIUM CHLORIDE 125 ML/HR: 9 INJECTION, SOLUTION INTRAVENOUS at 00:56

## 2017-09-07 NOTE — DISCHARGE SUMMARY
"    [facility] Medicine Services  DISCHARGE SUMMARY       Date of Admission: 9/4/2017  Date of Discharge:  9/7/2017  Primary Care Physician: Tod Medellin MD  Consulting Physician(s)          None         Discharge Diagnoses:  Active Hospital Problems (** Indicates Principal Problem)    Diagnosis Date Noted   • GERD (gastroesophageal reflux disease) [K21.9] 09/05/2017   • Complicated UTI (urinary tract infection) [N39.0] 09/05/2017   • Hypertension [I10] 09/05/2017   • Hyperglycemia [R73.9] 09/05/2017      Resolved Hospital Problems    Diagnosis Date Noted Date Resolved   • **Sepsis [A41.9] 09/05/2017 09/07/2017     Presenting Problem/History of Present Illness  Acute cystitis with hematuria [N30.01]     Chief Complaint on Day of Discharge:   Diarrhea, but the dysuria, urgency and pressure is gone    History of Present Illness on Day of Discharge:   States he feels better and has no dysuria symptoms and is ready to go home.  Patient's wife asked about his prostate so explained due to elevated PSA and complete UTI we'll send him for  appointment.    Hospital Course   Mr. Quintero is a 46-year-old male with past medical history significant for WPW, HTN, GERD who presented to [facility] ED on 9/4/17 with approximately 24 hours of dysuria.  Patient awakened at 2 AM Monday morning to avoid straining \"suddenly stopped and he couldn't get the rest out\".  Patient to return to bed only to be get up and find that \"nothing was coming out\".  Patient also complaining of suprapubic pressure which was relieved with voiding attempts.  Patient also complained of fever, chills, diaphoresis, nausea and nonbloody diarrhea.  Patient denies any penile pain or discharge or testicular pain.  Patient's wife states he appeared to be in acute distress when she got home from work and was concerned about his pallor and shallow breathing, so she brought him to [facility] ED for further evaluation.  Patient was febrile with T-Max 100.5, " tachycardic 127, blood glucose 136 and WBC 20.4.  Urinalysis was positive for acute infection, but had no nitrites.  Chest x-ray was clear.  Patient states he does not have a history of urinary tract infections or kidney stones.  Patient was admitted to the hospital medicine service for further evaluation and treatment.    Patient was given IV fluids and IV antibiotics and got increasingly better.  Patient's PSA was checked and was elevated.  Patient will be discharged to home with his wife with  follow-up for elevated PSA and complicated UTI.  Patient is ready for discharge home.     Procedures Performed   None    Consults:   Consults     No orders found from 8/6/2017 to 9/5/2017.        Pertinent Test Results:  Imaging Results (most recent)     Procedure Component Value Units Date/Time    XR Chest 1 View [[id number]]  (Abnormal) Collected:  09/04/17 2235     Updated:  09/04/17 2332    Narrative:       EXAM:  XR Chest, 1 View    CLINICAL HISTORY:  46 years old, male; Signs and symptoms; Other: Sepsis protocol C/O difficulty   urinating; Additional info: Severe sepsis protocol    TECHNIQUE:  Frontal view of the chest.    COMPARISON:  No relevant prior studies available.    FINDINGS:  Lungs:  Normal.  No consolidation.  Pleural space:  Normal.  No pneumothorax.  Heart:  Normal.  No cardiomegaly.  Mediastinum:  Normal.  Bones/joints:  Normal.      Impression:         Normal chest radiograph.    THIS DOCUMENT HAS BEEN ELECTRONICALLY SIGNED BY SHALINI MAC MD    CT Abdomen Pelvis With Contrast [[id number]]  (Abnormal) Collected:  09/05/17 0056     Updated:  09/05/17 0135    Narrative:       EXAM:  CT Abdomen and Pelvis With Intravenous Contrast    CLINICAL HISTORY:  46 years old, male; Sepsis, unspecified organism; Acute cystitis with   hematuria; Signs and symptoms; Other: See notes; Additional info: Urinary   retention    TECHNIQUE:  Axial computed tomography images of the abdomen and pelvis with intravenous    contrast.  All CT scans at this facility use one or more dose reduction   techniques, viz.: automated exposure control; ma/kV adjustment per patient size   (including targeted exams where dose is matched to indication; i.e. head); or   iterative reconstruction technique.  Coronal reformatted images were created and reviewed.    CONTRAST:  100 mL of isovue 300 administered intravenously.    COMPARISON:  No relevant prior studies available.    FINDINGS:  Lower thorax: No acute findings.    ABDOMEN:  Liver:  Hepatic steatosis.  Gallbladder and bile ducts:  Normal.  Pancreas:  Normal.  Spleen:  Normal.  Adrenals:  Normal.  Kidneys and ureters:  Normal.  Stomach and bowel:  Normal.  Appendix:  Appendix normal.    PELVIS:  Bladder:  Normal.  Reproductive:  Prostate gland is mildly enlarged, measuring 4.5 cm in   anteroposterior dimension.    ABDOMEN and PELVIS:  Intraperitoneal space:  Normal.  No free air.  No significant fluid collection.  Bones/joints:  No acute fracture.  No dislocation.  Soft tissues:  Normal.  Vasculature:  Normal.  No abdominal aortic aneurysm.  Lymph nodes:  Several small lymph nodes within the retroperitoneum, likely   reactive.      Impression:         1. No acute findings.    2. Non-acute findings are described above.    THIS DOCUMENT HAS BEEN ELECTRONICALLY SIGNED BY SHALINI MAC MD        Lab Results (most recent)     Procedure Component Value Units Date/Time    Urinalysis With / Culture If Indicated [[id number]]  (Abnormal) Collected:  09/04/17 2133    Specimen:  Urine from Urine, Clean Catch Updated:  09/04/17 2154     Color, UA Yellow     Appearance, UA Cloudy (A)     pH, UA 8.5 (H)     Specific Gravity, UA 1.021     Glucose, UA Negative     Ketones, UA Negative     Bilirubin, UA Negative     Blood, UA Large (3+) (A)     Protein, UA Trace (A)     Leuk Esterase, UA Moderate (2+) (A)     Nitrite, UA Negative     Urobilinogen, UA 1.0 E.U./dL    Urinalysis, Microscopic Only [[id number]]   (Abnormal) Collected:  09/04/17 2133    Specimen:  Urine from Urine, Clean Catch Updated:  09/04/17 2154     RBC, UA Too Numerous to Count (A) /HPF      WBC, UA Too Numerous to Count (A) /HPF      Bacteria, UA Trace /HPF      Squamous Epithelial Cells, UA None Seen /HPF      Hyaline Casts, UA 0-6 /LPF      Methodology Automated Microscopy    CBC & Differential [[id number]] Collected:  09/04/17 2250    Specimen:  Blood Updated:  09/04/17 2307    Narrative:       The following orders were created for panel order CBC & Differential.  Procedure                               Abnormality         Status                     ---------                               -----------         ------                     CBC Auto Differential[[id number]]        Abnormal            Final result                 Please view results for these tests on the individual orders.    CBC Auto Differential [[id number]]  (Abnormal) Collected:  09/04/17 2250    Specimen:  Blood Updated:  09/04/17 2307     WBC 20.41 (H) 10*3/mm3      RBC 4.74 10*6/mm3      Hemoglobin 13.8 g/dL      Hematocrit 41.2 %      MCV 86.9 fL      MCH 29.1 pg      MCHC 33.5 g/dL      RDW 12.8 %      RDW-SD 41.0 fl      MPV 10.0 fL      Platelets 229 10*3/mm3      Neutrophil % 83.6 (H) %      Lymphocyte % 6.3 (L) %      Monocyte % 9.4 %      Eosinophil % 0.1 %      Basophil % 0.1 %      Immature Grans % 0.5 %      Neutrophils, Absolute 17.06 (H) 10*3/mm3      Lymphocytes, Absolute 1.28 10*3/mm3      Monocytes, Absolute 1.92 (H) 10*3/mm3      Eosinophils, Absolute 0.02 10*3/mm3      Basophils, Absolute 0.03 10*3/mm3      Immature Grans, Absolute 0.10 (H) 10*3/mm3     Protime-INR [[id number]] Collected:  09/04/17 2250    Specimen:  Blood Updated:  09/04/17 2321     Protime 11.3 Seconds      INR 1.04    Narrative:       Therapeutic Ranges for INR: 2.0-3.0 (PT 20-30)                              2.5-3.5 (PT 25-34)    aPTT [[id number]]  (Normal) Collected:  09/04/17 2250    Specimen:   Blood Updated:  09/04/17 2321     PTT 29.1 seconds     Narrative:       PTT = The equivalent PTT values for the therapeutic range of heparin levels at 0.3 to 0.5 U/ml are 45 to 60 seconds.    Lactic Acid, Plasma [[id number]]  (Normal) Collected:  09/04/17 2250    Specimen:  Blood Updated:  09/04/17 2329     Lactate 1.9 mmol/L       Falsely depressed results may occur on samples drawn from patients receiving N-Acetylcysteine (NAC) or Metamizole.       C-reactive Protein [[id number]]  (Abnormal) Collected:  09/04/17 2250    Specimen:  Blood Updated:  09/04/17 2335     C-Reactive Protein 5.16 (H) mg/dL     Comprehensive Metabolic Panel [[id number]]  (Abnormal) Collected:  09/04/17 2250    Specimen:  Blood Updated:  09/04/17 2338     Glucose 136 (H) mg/dL      BUN 14 mg/dL      Creatinine 1.20 mg/dL      Sodium 139 mmol/L      Potassium 3.9 mmol/L      Chloride 103 mmol/L      CO2 25.0 mmol/L      Calcium 8.9 mg/dL      Total Protein 7.3 g/dL      Albumin 4.30 g/dL      ALT (SGPT) 62 (H) U/L      AST (SGOT) 32 U/L      Alkaline Phosphatase 63 U/L      Total Bilirubin 0.8 mg/dL      eGFR Non African Amer 65 mL/min/1.73      Globulin 3.0 gm/dL      A/G Ratio 1.4 (L) g/dL      BUN/Creatinine Ratio 11.7     Anion Gap 11.0 mmol/L     Narrative:       National Kidney Foundation Guidelines    Stage     Description        GFR  1         Normal or High     90+  2         Mild decrease      60-89  3         Moderate decrease  30-59  4         Severe decrease    15-29  5         Kidney failure     <15    Magnesium [[id number]]  (Normal) Collected:  09/04/17 2250    Specimen:  Blood Updated:  09/04/17 2338     Magnesium 1.7 mg/dL     Phosphorus [[id number]]  (Normal) Collected:  09/04/17 2250    Specimen:  Blood Updated:  09/04/17 2338     Phosphorus 2.4 mg/dL     Blood Gas, Arterial [[id number]]  (Abnormal) Collected:  09/04/17 2331    Specimen:  Arterial Blood Updated:  09/04/17 2340     Site Arterial: left radial     Tee's Test N/A      pH, Arterial 7.474 (H) pH units      pCO2, Arterial 33.9 (L) mm Hg      pO2, Arterial 71.9 (L) mm Hg      HCO3, Arterial 24.9 mmol/L      Base Excess, Arterial 1.7 mmol/L      O2 Saturation, Arterial 94.5 %      O2 Saturation Calculated 94.5 %      Hemoglobin, Blood Gas 14.0 g/dL      Hematocrit, Blood Gas 42.9 %      Oxyhemoglobin 94.5 %      Methemoglobin 0.80 %      Carboxyhemoglobin 1.3 %      CO2 Content 25.9     Barometric Pressure for Blood Gas -- mmHg       N/A        Modality Room Air     FIO2 21 %     Calcium, Ionized [[id number]]  (Normal) Collected:  09/04/17 2250    Specimen:  Blood Updated:  09/04/17 2340     Ionized Calcium 1.14 mmol/L     CBC Auto Differential [[id number]]  (Abnormal) Collected:  09/05/17 0319    Specimen:  Blood Updated:  09/05/17 0359     WBC 20.93 (H) 10*3/mm3      RBC 4.58 10*6/mm3      Hemoglobin 13.2 g/dL      Hematocrit 40.9 %      MCV 89.3 fL      MCH 28.8 pg      MCHC 32.3 g/dL      RDW 13.2 %      RDW-SD 42.9 fl      MPV 10.5 fL      Platelets 212 10*3/mm3      Neutrophil % 84.6 (H) %      Lymphocyte % 6.9 (L) %      Monocyte % 8.0 %      Eosinophil % 0.0 %      Basophil % 0.1 %      Immature Grans % 0.4 %      Neutrophils, Absolute 17.67 (H) 10*3/mm3      Lymphocytes, Absolute 1.45 10*3/mm3      Monocytes, Absolute 1.68 (H) 10*3/mm3      Eosinophils, Absolute 0.01 10*3/mm3      Basophils, Absolute 0.03 10*3/mm3      Immature Grans, Absolute 0.09 (H) 10*3/mm3     Basic Metabolic Panel [[id number]]  (Abnormal) Collected:  09/05/17 0319    Specimen:  Blood Updated:  09/05/17 0413     Glucose 140 (H) mg/dL      BUN 13 mg/dL      Creatinine 1.30 mg/dL      Sodium 139 mmol/L      Potassium 4.3 mmol/L      Chloride 104 mmol/L      CO2 25.0 mmol/L      Calcium 8.5 (L) mg/dL      eGFR Non African Amer 60 (L) mL/min/1.73      BUN/Creatinine Ratio 10.0     Anion Gap 10.0 mmol/L     Hemoglobin A1c [[id number]]  (Normal) Collected:  09/05/17 0319    Specimen:  Blood Updated:  09/05/17  0838     Hemoglobin A1C 5.40 %     Narrative:       The American Diabetes Association recommends maintenance of Hemoglobin A1C at 7.0% or lower. Goals for Hemoglobin A1C reduction may need to be modified if hypoglycemia is a problem.    Clostridium Difficile Toxin [[id number]] Collected:  09/05/17 1020    Specimen:  Stool from Per Rectum Updated:  09/05/17 1141    Narrative:       The following orders were created for panel order Clostridium Difficile Toxin.  Procedure                               Abnormality         Status                     ---------                               -----------         ------                     Clostridium Difficile To...[[id number]]  Normal              Final result                 Please view results for these tests on the individual orders.    Clostridium Difficile Toxin, PCR [[id number]]  (Normal) Collected:  09/05/17 1020    Specimen:  Stool from Per Rectum Updated:  09/05/17 1141     C. Difficile Toxins by PCR Not Detected    Narrative:         Performance characteristics of test not established for patients <2 years of age.  Negative for Toxigenic C. Difficile    CBC & Differential [[id number]] Collected:  09/06/17 0446    Specimen:  Blood Updated:  09/06/17 0640    Narrative:       The following orders were created for panel order CBC & Differential.  Procedure                               Abnormality         Status                     ---------                               -----------         ------                     CBC Auto Differential[[id number]]        Abnormal            Final result                 Please view results for these tests on the individual orders.    CBC Auto Differential [[id number]]  (Abnormal) Collected:  09/06/17 0446    Specimen:  Blood Updated:  09/06/17 0640     WBC 17.70 (H) 10*3/mm3      RBC 4.30 10*6/mm3      Hemoglobin 12.3 (L) g/dL      Hematocrit 38.3 (L) %      MCV 89.1 fL      MCH 28.6 pg      MCHC 32.1 g/dL      RDW 13.1 %      RDW-SD 42.3  fl      MPV 10.8 fL      Platelets 179 10*3/mm3      Neutrophil % 82.2 (H) %      Lymphocyte % 7.9 (L) %      Monocyte % 9.0 %      Eosinophil % 0.3 %      Basophil % 0.2 %      Immature Grans % 0.4 %      Neutrophils, Absolute 14.54 (H) 10*3/mm3      Lymphocytes, Absolute 1.40 10*3/mm3      Monocytes, Absolute 1.60 (H) 10*3/mm3      Eosinophils, Absolute 0.06 10*3/mm3      Basophils, Absolute 0.03 10*3/mm3      Immature Grans, Absolute 0.07 (H) 10*3/mm3     Basic Metabolic Panel [[id number]]  (Abnormal) Collected:  09/06/17 0446    Specimen:  Blood Updated:  09/06/17 0649     Glucose 116 (H) mg/dL      BUN 13 mg/dL      Creatinine 1.20 mg/dL      Sodium 137 mmol/L      Potassium 4.1 mmol/L      Chloride 105 mmol/L      CO2 25.0 mmol/L      Calcium 8.4 (L) mg/dL      eGFR Non African Amer 65 mL/min/1.73      BUN/Creatinine Ratio 10.8     Anion Gap 7.0 mmol/L     Narrative:       National Kidney Foundation Guidelines    Stage     Description        GFR  1         Normal or High     90+  2         Mild decrease      60-89  3         Moderate decrease  30-59  4         Severe decrease    15-29  5         Kidney failure     <15    Urine Culture [[id number]]  (Abnormal)  (Susceptibility) Collected:  09/04/17 2133    Specimen:  Urine from Urine, Clean Catch Updated:  09/06/17 1148     Urine Culture >100,000 CFU/mL Escherichia coli (A)    Susceptibility      Escherichia coli     GISEL     Ampicillin >16 ug/ml Resistant     Ampicillin + Sulbactam <=8/4 ug/ml Susceptible     Aztreonam <=8 ug/ml Susceptible     Cefepime <=8 ug/ml Susceptible     Cefotaxime <=2 ug/ml Susceptible     Ceftriaxone <=8 ug/ml Susceptible     Cefuroxime sodium 8 ug/ml Susceptible     Cephalothin >16 ug/ml Resistant     Ertapenem <=1 ug/ml Susceptible     Gentamicin <=4 ug/ml Susceptible     Levofloxacin <=2 ug/ml Susceptible     Meropenem <=1 ug/ml Susceptible     Nitrofurantoin <=32 ug/ml Susceptible     Piperacillin + Tazobactam <=16 ug/ml Susceptible      Tetracycline <=4 ug/ml Susceptible     Tobramycin <=4 ug/ml Susceptible     Trimethoprim + Sulfamethoxazole <=2/38 ug/ml Susceptible                    Blood Culture [[id number]]  (Normal) Collected:  09/04/17 2250    Specimen:  Blood from Arm, Left Updated:  09/07/17 0005     Blood Culture No growth at 2 days    Blood Culture [[id number]]  (Normal) Collected:  09/04/17 2250    Specimen:  Blood from Hand, Left Updated:  09/07/17 0005     Blood Culture No growth at 2 days    CBC & Differential [[id number]] Collected:  09/07/17 0456    Specimen:  Blood Updated:  09/07/17 0537    Narrative:       The following orders were created for panel order CBC & Differential.  Procedure                               Abnormality         Status                     ---------                               -----------         ------                     CBC Auto Differential[[id number]]        Abnormal            Final result                 Please view results for these tests on the individual orders.    CBC Auto Differential [[id number]]  (Abnormal) Collected:  09/07/17 0456    Specimen:  Blood Updated:  09/07/17 0537     WBC 10.08 10*3/mm3      RBC 4.32 10*6/mm3      Hemoglobin 12.4 (L) g/dL      Hematocrit 37.9 (L) %      MCV 87.7 fL      MCH 28.7 pg      MCHC 32.7 g/dL      RDW 12.6 %      RDW-SD 40.6 fl      MPV 10.4 fL      Platelets 207 10*3/mm3      Neutrophil % 76.6 (H) %      Lymphocyte % 12.4 (L) %      Monocyte % 8.5 %      Eosinophil % 1.7 %      Basophil % 0.3 %      Immature Grans % 0.5 %      Neutrophils, Absolute 7.72 10*3/mm3      Lymphocytes, Absolute 1.25 10*3/mm3      Monocytes, Absolute 0.86 10*3/mm3      Eosinophils, Absolute 0.17 10*3/mm3      Basophils, Absolute 0.03 10*3/mm3      Immature Grans, Absolute 0.05 (H) 10*3/mm3     Basic Metabolic Panel [[id number]]  (Normal) Collected:  09/07/17 0456    Specimen:  Blood Updated:  09/07/17 0643     Glucose 94 mg/dL      BUN 13 mg/dL      Creatinine 1.00 mg/dL       Sodium 138 mmol/L      Potassium 3.9 mmol/L      Chloride 105 mmol/L      CO2 25.0 mmol/L      Calcium 8.7 mg/dL      eGFR Non African Amer 81 mL/min/1.73      BUN/Creatinine Ratio 13.0     Anion Gap 8.0 mmol/L     Narrative:       National Kidney Foundation Guidelines    Stage     Description        GFR  1         Normal or High     90+  2         Mild decrease      60-89  3         Moderate decrease  30-59  4         Severe decrease    15-29  5         Kidney failure     <15    PSA, Total & Free [[id number]]  (Abnormal) Collected:  09/06/17 0446    Specimen:  Blood Updated:  09/07/17 0830     PSA 12.3 (H) ng/mL       Roche ECLIA methodology.  According to the American Urological Association, Serum PSA should  decrease and remain at undetectable levels after radical  prostatectomy. The AUA defines biochemical recurrence as an initial  PSA value 0.2 ng/mL or greater followed by a subsequent confirmatory  PSA value 0.2 ng/mL or greater.  Values obtained with different assay methods or kits cannot be used  interchangeably. Results cannot be interpreted as absolute evidence  of the presence or absence of malignant disease.        PSA, Free 4.65 ng/mL       Roche ECLIA methodology.        PSA, Free % 37.8 %       The table below lists the probability of prostate cancer for  men with non-suspicious KENRICK results and total PSA between  4 and 10 ng/mL, by patient age (Tristan et al, SARATH 1998,  279:1542).                    % Free PSA       50-64 yr        65-75 yr                    0.00-10.00%        56%             55%                   10.01-15.00%        24%             35%                   15.01-20.00%        17%             23%                   20.01-25.00%        10%             20%                        >25.00%         5%              9%  Please note:  Tristan et al did not make specific                recommendations regarding the use of                percent free PSA for any other population                of  "men.           Condition on Discharge:  Stable    Physical Exam on Discharge:/96  Pulse 80  Temp 98.4 °F (36.9 °C) (Oral)   Resp 18  Ht 72\" (182.9 cm)  Wt 289 lb (131 kg)  SpO2 90%  BMI 39.2 kg/m2  Physical Exam  General: Alert, well-developed well-nourished male in no acute distress    Head: Normocephalic atraumatic    Eyes: PERRLA, EOMI, nonicteric, conjunctiva normal    ENT: Pink, moist mucous membranes    Neck: Supple, nontender, trachea midline without lymphadenopathy, JVD, nuchal rigidity.      Cardiovascular: RRR  no M/R/G  +2 DP pulses    Respiratory: Nonlabored, symmetrical chest expansion, clear to auscultation bilaterally    Abdomen: Obese, soft, nontender, nondistended,  hyperactive bowel sounds in all 4 quadrants     Extremities: FROM in upper and lower extremities bilaterally.  Negative for edema/cyanosis/clubbing.  Negative calf pain    Skin: Pink/warm/dry.  No rash or lesions noted    Neuro: Alert and oriented to person place time and situation, speech is clear, follows all commands, recent and remote memory intact    Psych: Patient is pleasant and cooperative.  Normal affect.  Negative suicidal ideation or homicidal ideation.    Discharge Disposition  Home or Self Care    Discharge Medications   Joey Quintero   Home Medication Instructions SIS:[id number]    Printed on:09/07/17 1154   Medication Information                      cefuroxime (CEFTIN) 250 MG tablet  Take 2 tablets by mouth 2 (Two) Times a Day.             diclofenac (VOLTAREN) 75 MG EC tablet  Take 75 mg by mouth 2 (Two) Times a Day.             lisinopril (PRINIVIL,ZESTRIL) 20 MG tablet  Take 20 mg by mouth Daily.             omeprazole (priLOSEC) 40 MG capsule  Take 40 mg by mouth Daily.               Discharge Diet:   Diet Instructions     Diet: Regular; Thin       Discharge Diet:  Regular   Fluid Consistency:  Thin               Discharge Care Plan / Instructions:  Activity at Discharge:   Activity Instructions     " Activity as Tolerated                   Follow-up Appointments  No future appointments.  Additional Instructions for the Follow-ups that You Need to Schedule     Discharge Follow-Up With Specified Provider    As directed    To:  Urology for F/u elevated PSA and complicated UTI; please schedule the appointment prior to patient's discharge   Follow Up:  2 Weeks       Discharge Follow-up with PCP    As directed    Follow Up Details:  Follow-up with Dr. Tod Medellin within 7 days of discharge from the hospital; please schedule appointment for patient prior to discharge               Test Results Pending at Discharge Order Current Status    Blood Culture Preliminary result    Blood Culture Preliminary result        Shama Sultana, KENNA 09/07/17 11:54 AM    Time: Discharge 35 min    Please note that portions of this note may have been completed with a voice recognition program. Efforts were made to edit the dictations, but occasionally words are mistranscribed.  Attending Attestation      I supervised care of the patient on day of discharge with direct care provided by the advanced care provider (APC).    Keagan Wiggins MD

## 2017-09-07 NOTE — PLAN OF CARE
Problem: Patient Care Overview (Adult)  Goal: Plan of Care Review  Outcome: Ongoing (interventions implemented as appropriate)    09/06/17 2108 09/07/17 0439   Coping/Psychosocial Response Interventions   Plan Of Care Reviewed With patient;family --    Patient Care Overview   Progress --  improving       Goal: Adult Individualization and Mutuality  Outcome: Ongoing (interventions implemented as appropriate)  Goal: Discharge Needs Assessment  Outcome: Ongoing (interventions implemented as appropriate)    09/05/17 1514   Discharge Needs Assessment   Concerns To Be Addressed denies needs/concerns at this time   Readmission Within The Last 30 Days no previous admission in last 30 days   Equipment Needed After Discharge none   Discharge Disposition still a patient   Discharge Planning Comments Pt has [employer] and denies recent changes in insurance. Pt states his prescriptions require a copay, but copay is affordable. Pt uses [facility] pharmacy in [place] and denies difficulty obtaining meds. Pt plan is to return home with famiily when medically ready for discharge. and spouse will help him. Pt denies discharge needs. CM will cont to follow.   Current Health   Outpatient/Agency/Support Group Needs (Pt states has used HH in past, but unsure of agency. )   Anticipated Changes Related to Illness other (see comments)  (Denies discharge needs.)   Self-Care   Equipment Currently Used at Home none   Living Environment   Transportation Available car         Problem: Infection, Risk/Actual (Adult)  Goal: Identify Related Risk Factors and Signs and Symptoms  Outcome: Ongoing (interventions implemented as appropriate)    09/07/17 0439   Infection, Risk/Actual   Infection, Risk/Actual: Related Risk Factors (elevated WBC count)   Signs and Symptoms (Infection, Risk/Actual) lab value changes       Goal: Infection Prevention/Resolution  Outcome: Ongoing (interventions implemented as appropriate)    09/06/17 1520    Infection, Risk/Actual (Adult)   Infection Prevention/Resolution making progress toward outcome

## 2017-09-07 NOTE — PLAN OF CARE
Problem: Patient Care Overview (Adult)  Goal: Plan of Care Review  Outcome: Ongoing (interventions implemented as appropriate)    09/07/17 1205   Coping/Psychosocial Response Interventions   Plan Of Care Reviewed With patient;spouse   Patient Care Overview   Progress improving       Goal: Adult Individualization and Mutuality  Outcome: Ongoing (interventions implemented as appropriate)  Goal: Discharge Needs Assessment  Outcome: Ongoing (interventions implemented as appropriate)    09/05/17 1514   Discharge Needs Assessment   Concerns To Be Addressed denies needs/concerns at this time   Readmission Within The Last 30 Days no previous admission in last 30 days   Equipment Needed After Discharge none   Discharge Disposition still a patient   Current Health   Outpatient/Agency/Support Group Needs (Pt states has used HH in past, but unsure of agency. )   Anticipated Changes Related to Illness other (see comments)  (Denies discharge needs.)   Self-Care   Equipment Currently Used at Home none   Living Environment   Transportation Available car         Problem: Infection, Risk/Actual (Adult)  Goal: Identify Related Risk Factors and Signs and Symptoms  Outcome: Ongoing (interventions implemented as appropriate)  Goal: Infection Prevention/Resolution  Outcome: Ongoing (interventions implemented as appropriate)    09/07/17 1205   Infection, Risk/Actual (Adult)   Infection Prevention/Resolution making progress toward outcome

## 2017-09-10 LAB
BACTERIA SPEC AEROBE CULT: NORMAL
BACTERIA SPEC AEROBE CULT: NORMAL

## 2020-01-13 ENCOUNTER — TRANSCRIBE ORDERS (OUTPATIENT)
Dept: NUTRITION | Facility: HOSPITAL | Age: 50
End: 2020-01-13

## 2020-01-13 DIAGNOSIS — R73.01 IMPAIRED FASTING GLUCOSE: Primary | ICD-10-CM

## 2020-01-20 ENCOUNTER — HOSPITAL ENCOUNTER (OUTPATIENT)
Dept: NUTRITION | Facility: HOSPITAL | Age: 50
Setting detail: RECURRING SERIES
Discharge: HOME | End: 2020-01-20

## 2020-01-20 VITALS — WEIGHT: 310.8 LBS | HEIGHT: 72 IN | BODY MASS INDEX: 42.1 KG/M2

## 2020-01-20 PROCEDURE — 97802 MEDICAL NUTRITION INDIV IN: CPT | Performed by: DIETITIAN, REGISTERED

## 2020-02-18 ENCOUNTER — TELEPHONE (OUTPATIENT)
Dept: NUTRITION | Facility: HOSPITAL | Age: 50
End: 2020-02-18

## 2020-02-18 NOTE — PROGRESS NOTES
Adult Outpatient Nutrition  Assessment    Patient Name:  Joey Quintero  YOB: 1970  MRN: [id number]    Assessment Date:  2/18/2020    Comments:  Patient was seen on January 20 for nutrition counseling, he no show for follow up appointment on February 17. Spoke with patient on the phone for follow up. Patient reports weight loss of 4.5 kg, self-reported weight of 132 kg (started at 137.4 kg). Patient reports cutting out all regular Mt. Dew soda, he now only drinks diet or Mt. Dew Zero, also drinking more water. Patient states he has not add vegetable to his diet. Patient declined to reschedule follow up visit at this time. State he will talk to his wife to see if they need to reschedule. No questions or concerns for dietitian, encouraged patient to call as needed.        Electronically signed by:  Nasra Kohli RD  02/18/20 11:58 AM